# Patient Record
Sex: FEMALE | Race: WHITE | Employment: PART TIME | URBAN - METROPOLITAN AREA
[De-identification: names, ages, dates, MRNs, and addresses within clinical notes are randomized per-mention and may not be internally consistent; named-entity substitution may affect disease eponyms.]

---

## 2019-09-11 ENCOUNTER — HOSPITAL ENCOUNTER (OUTPATIENT)
Dept: WOMENS IMAGING | Age: 49
Discharge: HOME | End: 2019-09-11
Payer: COMMERCIAL

## 2019-09-11 DIAGNOSIS — Z12.31 BREAST CANCER SCREENING BY MAMMOGRAM: ICD-10-CM

## 2019-09-11 PROCEDURE — 77067 SCR MAMMO BI INCL CAD: CPT

## 2020-01-26 ENCOUNTER — APPOINTMENT (OUTPATIENT)
Dept: CT IMAGING | Age: 50
End: 2020-01-26
Payer: COMMERCIAL

## 2020-01-26 ENCOUNTER — HOSPITAL ENCOUNTER (EMERGENCY)
Age: 50
Discharge: HOME | End: 2020-01-26
Attending: EMERGENCY MEDICINE
Payer: COMMERCIAL

## 2020-01-26 VITALS
TEMPERATURE: 97.4 F | OXYGEN SATURATION: 99 % | BODY MASS INDEX: 40.85 KG/M2 | HEART RATE: 68 BPM | WEIGHT: 222 LBS | HEIGHT: 62 IN | DIASTOLIC BLOOD PRESSURE: 84 MMHG | SYSTOLIC BLOOD PRESSURE: 161 MMHG | RESPIRATION RATE: 18 BRPM

## 2020-01-26 LAB
A/G RATIO: 1.1 (ref 1.1–2.2)
ABO/RH: NORMAL
ALBUMIN SERPL-MCNC: 4.3 G/DL (ref 3.4–5)
ALP BLD-CCNC: 72 U/L (ref 40–129)
ALT SERPL-CCNC: 20 U/L (ref 10–40)
ANION GAP SERPL CALCULATED.3IONS-SCNC: 14 MMOL/L (ref 3–16)
ANTIBODY SCREEN: NORMAL
ANTIBODY SCREEN: NORMAL
AST SERPL-CCNC: 20 U/L (ref 15–37)
BACTERIA: ABNORMAL /HPF
BASOPHILS ABSOLUTE: 0.1 K/UL (ref 0–0.2)
BASOPHILS RELATIVE PERCENT: 0.6 %
BILIRUB SERPL-MCNC: 0.4 MG/DL (ref 0–1)
BILIRUBIN URINE: NEGATIVE
BLOOD, URINE: ABNORMAL
BUN BLDV-MCNC: 10 MG/DL (ref 7–20)
CALCIUM SERPL-MCNC: 9.5 MG/DL (ref 8.3–10.6)
CHLORIDE BLD-SCNC: 105 MMOL/L (ref 99–110)
CLARITY: ABNORMAL
CO2: 20 MMOL/L (ref 21–32)
COLOR: YELLOW
CREAT SERPL-MCNC: 0.6 MG/DL (ref 0.6–1.1)
EOSINOPHILS ABSOLUTE: 0 K/UL (ref 0–0.6)
EOSINOPHILS RELATIVE PERCENT: 0.3 %
EPITHELIAL CELLS, UA: 6 /HPF (ref 0–5)
GFR AFRICAN AMERICAN: >60
GFR NON-AFRICAN AMERICAN: >60
GLOBULIN: 4 G/DL
GLUCOSE BLD-MCNC: 165 MG/DL (ref 70–99)
GLUCOSE URINE: NEGATIVE MG/DL
HCG QUALITATIVE: NEGATIVE
HCT VFR BLD CALC: 41.1 % (ref 36–48)
HEMOGLOBIN: 13.2 G/DL (ref 12–16)
HYALINE CASTS: 4 /LPF (ref 0–8)
KETONES, URINE: 40 MG/DL
LEUKOCYTE ESTERASE, URINE: ABNORMAL
LIPASE: 31 U/L (ref 13–60)
LYMPHOCYTES ABSOLUTE: 3.3 K/UL (ref 1–5.1)
LYMPHOCYTES RELATIVE PERCENT: 21.9 %
MCH RBC QN AUTO: 25.7 PG (ref 26–34)
MCHC RBC AUTO-ENTMCNC: 32.1 G/DL (ref 31–36)
MCV RBC AUTO: 79.9 FL (ref 80–100)
MICROSCOPIC EXAMINATION: YES
MONOCYTES ABSOLUTE: 0.7 K/UL (ref 0–1.3)
MONOCYTES RELATIVE PERCENT: 4.6 %
NEUTROPHILS ABSOLUTE: 10.8 K/UL (ref 1.7–7.7)
NEUTROPHILS RELATIVE PERCENT: 72.6 %
NITRITE, URINE: NEGATIVE
PDW BLD-RTO: 14.6 % (ref 12.4–15.4)
PH UA: 7 (ref 5–8)
PLATELET # BLD: 271 K/UL (ref 135–450)
PMV BLD AUTO: 8.2 FL (ref 5–10.5)
POTASSIUM REFLEX MAGNESIUM: 4.7 MMOL/L (ref 3.5–5.1)
PROTEIN UA: ABNORMAL MG/DL
RBC # BLD: 5.14 M/UL (ref 4–5.2)
RBC UA: 2 /HPF (ref 0–4)
SODIUM BLD-SCNC: 139 MMOL/L (ref 136–145)
SPECIFIC GRAVITY UA: 1.02 (ref 1–1.03)
TOTAL PROTEIN: 8.3 G/DL (ref 6.4–8.2)
URINE REFLEX TO CULTURE: YES
URINE TYPE: ABNORMAL
UROBILINOGEN, URINE: 0.2 E.U./DL
WBC # BLD: 14.9 K/UL (ref 4–11)
WBC UA: 8 /HPF (ref 0–5)

## 2020-01-26 PROCEDURE — 83690 ASSAY OF LIPASE: CPT

## 2020-01-26 PROCEDURE — 36415 COLL VENOUS BLD VENIPUNCTURE: CPT

## 2020-01-26 PROCEDURE — 86900 BLOOD TYPING SEROLOGIC ABO: CPT

## 2020-01-26 PROCEDURE — 2580000003 HC RX 258: Performed by: PHYSICIAN ASSISTANT

## 2020-01-26 PROCEDURE — 96374 THER/PROPH/DIAG INJ IV PUSH: CPT

## 2020-01-26 PROCEDURE — 6360000002 HC RX W HCPCS: Performed by: PHYSICIAN ASSISTANT

## 2020-01-26 PROCEDURE — 84703 CHORIONIC GONADOTROPIN ASSAY: CPT

## 2020-01-26 PROCEDURE — 86850 RBC ANTIBODY SCREEN: CPT

## 2020-01-26 PROCEDURE — 99284 EMERGENCY DEPT VISIT MOD MDM: CPT

## 2020-01-26 PROCEDURE — 96375 TX/PRO/DX INJ NEW DRUG ADDON: CPT

## 2020-01-26 PROCEDURE — 6360000004 HC RX CONTRAST MEDICATION: Performed by: PHYSICIAN ASSISTANT

## 2020-01-26 PROCEDURE — 86901 BLOOD TYPING SEROLOGIC RH(D): CPT

## 2020-01-26 PROCEDURE — 81001 URINALYSIS AUTO W/SCOPE: CPT

## 2020-01-26 PROCEDURE — 80053 COMPREHEN METABOLIC PANEL: CPT

## 2020-01-26 PROCEDURE — 74177 CT ABD & PELVIS W/CONTRAST: CPT

## 2020-01-26 PROCEDURE — 85025 COMPLETE CBC W/AUTO DIFF WBC: CPT

## 2020-01-26 PROCEDURE — 87086 URINE CULTURE/COLONY COUNT: CPT

## 2020-01-26 RX ORDER — 0.9 % SODIUM CHLORIDE 0.9 %
1000 INTRAVENOUS SOLUTION INTRAVENOUS ONCE
Status: COMPLETED | OUTPATIENT
Start: 2020-01-26 | End: 2020-01-26

## 2020-01-26 RX ORDER — KETOROLAC TROMETHAMINE 30 MG/ML
30 INJECTION, SOLUTION INTRAMUSCULAR; INTRAVENOUS ONCE
Status: COMPLETED | OUTPATIENT
Start: 2020-01-26 | End: 2020-01-26

## 2020-01-26 RX ORDER — HYDROCODONE BITARTRATE AND ACETAMINOPHEN 5; 325 MG/1; MG/1
1 TABLET ORAL EVERY 6 HOURS PRN
Qty: 10 TABLET | Refills: 0 | Status: SHIPPED | OUTPATIENT
Start: 2020-01-26 | End: 2020-01-29

## 2020-01-26 RX ORDER — ONDANSETRON 2 MG/ML
4 INJECTION INTRAMUSCULAR; INTRAVENOUS ONCE
Status: COMPLETED | OUTPATIENT
Start: 2020-01-26 | End: 2020-01-26

## 2020-01-26 RX ADMIN — SODIUM CHLORIDE 1000 ML: 9 INJECTION, SOLUTION INTRAVENOUS at 12:06

## 2020-01-26 RX ADMIN — KETOROLAC TROMETHAMINE 30 MG: 30 INJECTION, SOLUTION INTRAMUSCULAR at 12:01

## 2020-01-26 RX ADMIN — IOPAMIDOL 75 ML: 755 INJECTION, SOLUTION INTRAVENOUS at 12:47

## 2020-01-26 RX ADMIN — ONDANSETRON 4 MG: 2 INJECTION INTRAMUSCULAR; INTRAVENOUS at 11:34

## 2020-01-26 SDOH — HEALTH STABILITY: MENTAL HEALTH: HOW OFTEN DO YOU HAVE A DRINK CONTAINING ALCOHOL?: NEVER

## 2020-01-26 ASSESSMENT — PAIN SCALES - GENERAL
PAINLEVEL_OUTOF10: 7
PAINLEVEL_OUTOF10: 10

## 2020-01-26 ASSESSMENT — ENCOUNTER SYMPTOMS
ABDOMINAL PAIN: 1
DIARRHEA: 0
COLOR CHANGE: 0
RESPIRATORY NEGATIVE: 1
VOMITING: 0
COUGH: 0
SHORTNESS OF BREATH: 0
CONSTIPATION: 0
BACK PAIN: 0
NAUSEA: 0

## 2020-01-26 ASSESSMENT — PAIN DESCRIPTION - PAIN TYPE: TYPE: ACUTE PAIN

## 2020-01-26 ASSESSMENT — PAIN DESCRIPTION - LOCATION: LOCATION: PELVIS

## 2020-01-26 NOTE — ED PROVIDER NOTES
I independently evaluated and obtained a history and physical on [address] [place] [address]. All diagnostic, treatment, and disposition assistants were made to myself in conjunction the advanced practice provider. For further details of this patient's emergency department encounter, please see the advanced practice provider's documentation. History: Patient presents to the emergency department for evaluation of lower abdominal discomfort as well as vaginal bleeding. Patient reports that she has a history of heavy menses. Her menses were heavier than usual last month. She saw her gynecologist, who prescribed her contraceptive medication that she has been taking twice daily. Her bleeding did subside initially, but on Friday she started having vaginal bleeding again as well as some pain and passage of several large clots. Rated the pain at 7 out of 10. No vaginal discharge. No fevers, chills, nausea, or vomiting. No lightheadedness or dizziness. Reports feeling better at time of my reassessment. Physician Exam: Physical Exam  Vitals signs and nursing note reviewed. Constitutional:       Appearance: She is well-developed. HENT:      Head: Normocephalic and atraumatic. Eyes:      Conjunctiva/sclera: Conjunctivae normal.      Pupils: Pupils are equal, round, and reactive to light. Neck:      Musculoskeletal: Normal range of motion and neck supple. Cardiovascular:      Rate and Rhythm: Normal rate. Pulmonary:      Effort: Pulmonary effort is normal. No respiratory distress. Abdominal:      General: There is no distension. Palpations: Abdomen is soft. Musculoskeletal: Normal range of motion. General: No deformity. Skin:     General: Skin is warm and dry. Neurological:      Mental Status: She is alert and oriented to person, place, and time. Cranial Nerves: No cranial nerve deficit. Motor: No abnormal muscle tone.    Psychiatric:         Behavior: Behavior normal.

## 2020-01-26 NOTE — ED PROVIDER NOTES
which have NOT CHANGED    Details   norethindrone (AYGESTIN) 5 MG tablet TAKE 2 BY ORAL ROUTE ONCE DAILYHistorical Med               ALLERGIES     Patient has no known allergies. FAMILYHISTORY     History reviewed. No pertinent family history. SOCIAL HISTORY       Social History     Tobacco Use    Smoking status: Never Smoker    Smokeless tobacco: Never Used   Substance Use Topics    Alcohol use: Never     Frequency: Never    Drug use: Never       SCREENINGS             PHYSICAL EXAM    (up to 7 for level 4, 8 or more for level 5)     ED Triage Vitals [01/26/20 1035]   BP Temp Temp Source Pulse Resp SpO2 Height Weight   (!) 172/99 97.4 °F (36.3 °C) Oral 65 18 100 % 5' 2\" (1.575 m) 222 lb (100.7 kg)       Physical Exam  Constitutional:       General: She is not in acute distress. Appearance: Normal appearance. She is well-developed. She is not ill-appearing, toxic-appearing or diaphoretic. HENT:      Head: Normocephalic and atraumatic. Right Ear: External ear normal.      Left Ear: External ear normal.   Eyes:      General:         Right eye: No discharge. Left eye: No discharge. Neck:      Musculoskeletal: Normal range of motion and neck supple. Cardiovascular:      Rate and Rhythm: Normal rate and regular rhythm. Pulses: Normal pulses. Heart sounds: Normal heart sounds. No murmur. No friction rub. No gallop. Pulmonary:      Effort: Pulmonary effort is normal. No respiratory distress. Breath sounds: Normal breath sounds. No stridor. No wheezing, rhonchi or rales. Chest:      Chest wall: No tenderness. Abdominal:      General: Abdomen is flat. Bowel sounds are normal. There is no distension. Palpations: Abdomen is soft. There is no mass. Tenderness: There is abdominal tenderness in the right lower quadrant, suprapubic area and left lower quadrant. There is no right CVA tenderness, left CVA tenderness, guarding or rebound.  Negative signs include ondansetron (ZOFRAN) injection 4 mg (4 mg Intravenous Given 1/26/20 1134)   ketorolac (TORADOL) injection 30 mg (30 mg Intravenous Given 1/26/20 1201)   0.9 % sodium chloride bolus (0 mLs Intravenous Stopped 1/26/20 1348)   iopamidol (ISOVUE-370) 76 % injection 75 mL (75 mLs Intravenous Given 1/26/20 1247)       Patient is a 48-year-old female who presents to the ED with complaint of abnormal uterine bleeding. Patient states she has had heavy and abnormal bleeding for the past month. Patient states he was seen by her OB/GYN and given aygestin did help her symptoms. Patient states she has been taking as prescribed. States she occasionally has missed a couple dosages but states has otherwise been taking. Patient states she started having bleeding again on Friday that was heavy. States not as heavy as when it first started but states is passing clots. Patient states she has had increasing lower abdominal/pelvic pain. States much worse in the first time. Came to the ED for further evaluation and treatment. IV established patient given Zofran, Toradol and fluids here in the ED for symptom control. Abdomen showed generalized tenderness palpation mostly over the lower abdomen. No rebound or guarding. CBC showed white count of 14.9 with normal hemoglobin and platelets. Lipase normal.  Pregnancy negative. Blood type O- urinalysis relatively unremarkable. Given history and physical examination patient complaining of lower abdominal pain with white count of 14.9 at this time. CT of the abdomen pelvis ordered at this time given patient's complaints. CT showed abnormal appearance of the lower uterine segment and cervix concerning for malignancy. There does appear to be multiple liver lesions concerning for hepatic metastatic disease with retroperitoneal and left pelvic sidewall adenopathy at this time.   Did discuss with patient abnormal CT findings at this time and given patient's abnormal uterine bleeding

## 2020-01-27 LAB — URINE CULTURE, ROUTINE: NORMAL

## 2021-02-02 ENCOUNTER — HOSPITAL ENCOUNTER (OUTPATIENT)
Dept: MRI IMAGING | Age: 51
Discharge: HOME | End: 2021-02-02
Payer: COMMERCIAL

## 2021-02-02 DIAGNOSIS — N93.9 VAGINAL BLEEDING: ICD-10-CM

## 2021-02-02 DIAGNOSIS — K76.9 LIVER DISEASE: ICD-10-CM

## 2021-02-02 PROCEDURE — 6360000004 HC RX CONTRAST MEDICATION: Performed by: INTERNAL MEDICINE

## 2021-02-02 PROCEDURE — 74183 MRI ABD W/O CNTR FLWD CNTR: CPT

## 2021-02-02 PROCEDURE — A9577 INJ MULTIHANCE: HCPCS | Performed by: INTERNAL MEDICINE

## 2021-02-02 PROCEDURE — 72197 MRI PELVIS W/O & W/DYE: CPT

## 2021-02-02 PROCEDURE — 2580000003 HC RX 258: Performed by: INTERNAL MEDICINE

## 2021-02-02 RX ORDER — SODIUM CHLORIDE 0.9 % (FLUSH) 0.9 %
10 SYRINGE (ML) INJECTION ONCE
Status: COMPLETED | OUTPATIENT
Start: 2021-02-02 | End: 2021-02-02

## 2021-02-02 RX ADMIN — GADOBENATE DIMEGLUMINE 19 ML: 529 INJECTION, SOLUTION INTRAVENOUS at 09:49

## 2021-02-02 RX ADMIN — Medication 10 ML: at 09:50

## 2021-02-17 ENCOUNTER — HOSPITAL ENCOUNTER (OUTPATIENT)
Dept: CT IMAGING | Age: 51
Discharge: HOME | End: 2021-02-17
Payer: COMMERCIAL

## 2021-02-17 PROCEDURE — 6360000004 HC RX CONTRAST MEDICATION: Performed by: INTERNAL MEDICINE

## 2021-02-17 PROCEDURE — 74170 CT ABD WO CNTRST FLWD CNTRST: CPT

## 2021-02-17 RX ADMIN — IOPAMIDOL 75 ML: 755 INJECTION, SOLUTION INTRAVENOUS at 13:31

## 2021-02-23 ENCOUNTER — HOSPITAL ENCOUNTER (OUTPATIENT)
Age: 51
Discharge: HOME | End: 2021-02-23
Payer: COMMERCIAL

## 2021-02-23 LAB
A/G RATIO: 1.4 (ref 1.1–2.2)
ALBUMIN SERPL-MCNC: 4.6 G/DL (ref 3.4–5)
ALP BLD-CCNC: 93 U/L (ref 40–129)
ALT SERPL-CCNC: 22 U/L (ref 10–40)
ANION GAP SERPL CALCULATED.3IONS-SCNC: 14 MMOL/L (ref 3–16)
AST SERPL-CCNC: 23 U/L (ref 15–37)
BASOPHILS ABSOLUTE: 0 K/UL (ref 0–0.2)
BASOPHILS RELATIVE PERCENT: 0.3 %
BILIRUB SERPL-MCNC: 0.5 MG/DL (ref 0–1)
BUN BLDV-MCNC: 17 MG/DL (ref 7–20)
CALCIUM SERPL-MCNC: 10.2 MG/DL (ref 8.3–10.6)
CHLORIDE BLD-SCNC: 102 MMOL/L (ref 99–110)
CHOLESTEROL, TOTAL: 178 MG/DL (ref 0–199)
CO2: 26 MMOL/L (ref 21–32)
CREAT SERPL-MCNC: 0.6 MG/DL (ref 0.6–1.1)
EOSINOPHILS ABSOLUTE: 0.1 K/UL (ref 0–0.6)
EOSINOPHILS RELATIVE PERCENT: 1 %
GFR AFRICAN AMERICAN: >60
GFR NON-AFRICAN AMERICAN: >60
GLOBULIN: 3.4 G/DL
GLUCOSE BLD-MCNC: 102 MG/DL (ref 70–99)
HCT VFR BLD CALC: 40.3 % (ref 36–48)
HDLC SERPL-MCNC: 76 MG/DL (ref 40–60)
HEMOGLOBIN: 13.5 G/DL (ref 12–16)
LDL CHOLESTEROL CALCULATED: 84 MG/DL
LYMPHOCYTES ABSOLUTE: 4.9 K/UL (ref 1–5.1)
LYMPHOCYTES RELATIVE PERCENT: 41.7 %
MCH RBC QN AUTO: 27.7 PG (ref 26–34)
MCHC RBC AUTO-ENTMCNC: 33.5 G/DL (ref 31–36)
MCV RBC AUTO: 82.5 FL (ref 80–100)
MONOCYTES ABSOLUTE: 0.9 K/UL (ref 0–1.3)
MONOCYTES RELATIVE PERCENT: 7.6 %
NEUTROPHILS ABSOLUTE: 5.8 K/UL (ref 1.7–7.7)
NEUTROPHILS RELATIVE PERCENT: 49.4 %
PDW BLD-RTO: 14.3 % (ref 12.4–15.4)
PLATELET # BLD: 238 K/UL (ref 135–450)
PMV BLD AUTO: 8.5 FL (ref 5–10.5)
POTASSIUM SERPL-SCNC: 4.9 MMOL/L (ref 3.5–5.1)
RBC # BLD: 4.88 M/UL (ref 4–5.2)
SODIUM BLD-SCNC: 142 MMOL/L (ref 136–145)
TOTAL PROTEIN: 8 G/DL (ref 6.4–8.2)
TRIGL SERPL-MCNC: 91 MG/DL (ref 0–150)
VITAMIN D 25-HYDROXY: 24.5 NG/ML
VLDLC SERPL CALC-MCNC: 18 MG/DL
WBC # BLD: 11.8 K/UL (ref 4–11)

## 2021-02-23 PROCEDURE — 80061 LIPID PANEL: CPT

## 2021-02-23 PROCEDURE — 36415 COLL VENOUS BLD VENIPUNCTURE: CPT

## 2021-02-23 PROCEDURE — 85025 COMPLETE CBC W/AUTO DIFF WBC: CPT

## 2021-02-23 PROCEDURE — 83036 HEMOGLOBIN GLYCOSYLATED A1C: CPT

## 2021-02-23 PROCEDURE — 80053 COMPREHEN METABOLIC PANEL: CPT

## 2021-02-23 PROCEDURE — 82306 VITAMIN D 25 HYDROXY: CPT

## 2021-02-24 LAB
ESTIMATED AVERAGE GLUCOSE: 116.9 MG/DL
HBA1C MFR BLD: 5.7 %

## 2021-10-04 ENCOUNTER — HOSPITAL ENCOUNTER (OUTPATIENT)
Dept: WOMENS IMAGING | Age: 51
Discharge: HOME | End: 2021-10-04
Payer: COMMERCIAL

## 2021-10-04 VITALS — WEIGHT: 200 LBS | HEIGHT: 62 IN | BODY MASS INDEX: 36.8 KG/M2

## 2021-10-04 DIAGNOSIS — Z12.31 ENCOUNTER FOR SCREENING MAMMOGRAM FOR MALIGNANT NEOPLASM OF BREAST: ICD-10-CM

## 2021-10-04 PROCEDURE — 77063 BREAST TOMOSYNTHESIS BI: CPT

## 2021-10-14 ENCOUNTER — OFFICE VISIT (OUTPATIENT)
Dept: VASCULAR SURGERY | Age: 51
End: 2021-10-14
Payer: COMMERCIAL

## 2021-10-14 VITALS — HEIGHT: 62 IN | BODY MASS INDEX: 36.44 KG/M2 | WEIGHT: 198 LBS

## 2021-10-14 DIAGNOSIS — I83.893 SYMPTOMATIC VARICOSE VEINS OF BOTH LOWER EXTREMITIES: Primary | ICD-10-CM

## 2021-10-14 PROCEDURE — 1036F TOBACCO NON-USER: CPT | Performed by: SURGERY

## 2021-10-14 PROCEDURE — 3017F COLORECTAL CA SCREEN DOC REV: CPT | Performed by: SURGERY

## 2021-10-14 PROCEDURE — G8417 CALC BMI ABV UP PARAM F/U: HCPCS | Performed by: SURGERY

## 2021-10-14 PROCEDURE — G8484 FLU IMMUNIZE NO ADMIN: HCPCS | Performed by: SURGERY

## 2021-10-14 PROCEDURE — 99204 OFFICE O/P NEW MOD 45 MIN: CPT | Performed by: SURGERY

## 2021-10-14 PROCEDURE — G8428 CUR MEDS NOT DOCUMENT: HCPCS | Performed by: SURGERY

## 2021-10-14 RX ORDER — POTASSIUM CHLORIDE 750 MG/1
CAPSULE, EXTENDED RELEASE ORAL
COMMUNITY
Start: 2021-08-11

## 2021-10-14 RX ORDER — LOSARTAN POTASSIUM 50 MG/1
TABLET ORAL
COMMUNITY
Start: 2021-09-07

## 2021-10-14 RX ORDER — HYDROCHLOROTHIAZIDE 25 MG/1
TABLET ORAL
COMMUNITY
Start: 2021-10-08

## 2021-10-14 RX ORDER — MULTIVIT-MIN/IRON/FOLIC ACID/K 18-600-40
CAPSULE ORAL
COMMUNITY
End: 2021-11-18 | Stop reason: ALTCHOICE

## 2021-10-14 ASSESSMENT — ENCOUNTER SYMPTOMS
EYES NEGATIVE: 1
ALLERGIC/IMMUNOLOGIC NEGATIVE: 1
BACK PAIN: 1
RESPIRATORY NEGATIVE: 1
GASTROINTESTINAL NEGATIVE: 1

## 2021-10-14 NOTE — PROGRESS NOTES
Subjective:      Patient ID: Radha Venegas is a 45 y.o. female. HPI Referred for evaluation of bilateral varicose veins and associated throbbing achy pain worse with standing kneeling and squatting as well as menses. Improved with elevation. Patient has not worn compression stockings. No previous venous interventions. No h/o SVT, bleeding, ulceration, dermatitis or swelling. + FH    Past Medical History:   Diagnosis Date    History of dilatation and curettage 02/17/2020     Past Surgical History:   Procedure Laterality Date    MOUTH SURGERY       No Known Allergies  Current Outpatient Medications   Medication Sig Dispense Refill    Cholecalciferol (VITAMIN D) 50 MCG (2000 UT) CAPS capsule Take by mouth      potassium chloride (MICRO-K) 10 MEQ extended release capsule       losartan (COZAAR) 50 MG tablet TAKE 1 TABLET BY MOUTH EVERY DAY      hydroCHLOROthiazide (HYDRODIURIL) 25 MG tablet       norethindrone (AYGESTIN) 5 MG tablet TAKE 2 BY ORAL ROUTE ONCE DAILY       No current facility-administered medications for this visit. Social History     Socioeconomic History    Marital status:      Spouse name: Not on file    Number of children: Not on file    Years of education: Not on file    Highest education level: Not on file   Occupational History    Not on file   Tobacco Use    Smoking status: Never Smoker    Smokeless tobacco: Never Used   Substance and Sexual Activity    Alcohol use: Never    Drug use: Never    Sexual activity: Not on file   Other Topics Concern    Not on file   Social History Narrative    Not on file     Social Determinants of Health     Financial Resource Strain:     Difficulty of Paying Living Expenses:    Food Insecurity:     Worried About Running Out of Food in the Last Year:     [address] in the Last Year:    Transportation Needs:     Lack of Transportation (Medical):      Lack of Transportation (Non-Medical):    Physical Activity:     Days of Exercise per Week:     Minutes of Exercise per Session:    Stress:     Feeling of Stress :    Social Connections:     Frequency of Communication with Friends and Family:     Frequency of Social Gatherings with Friends and Family:     Attends Hinduism Services:     Active Member of Clubs or Organizations:     Attends Club or Organization Meetings:     Marital Status:    Intimate Partner Violence:     Fear of Current or Ex-Partner:     Emotionally Abused:     Physically Abused:     Sexually Abused:      No family history on file. Review of Systems   Constitutional: Positive for activity change (increase) and appetite change (decrease). HENT: Negative. Eyes: Negative. Respiratory: Negative. Cardiovascular: Positive for leg swelling. Gastrointestinal: Negative. Endocrine: Negative. Genitourinary: Negative. Musculoskeletal: Positive for back pain. Skin: Negative. Allergic/Immunologic: Negative. Neurological: Negative. Hematological: Bruises/bleeds easily. Psychiatric/Behavioral: Negative. Objective:   Physical Exam  Vitals and nursing note reviewed. Constitutional:       Appearance: She is obese. HENT:      Head: Normocephalic and atraumatic. Right Ear: External ear normal.      Left Ear: External ear normal.      Nose: Nose normal.      Mouth/Throat:      Mouth: Mucous membranes are moist.      Pharynx: Oropharynx is clear. Eyes:      Extraocular Movements: Extraocular movements intact. Conjunctiva/sclera: Conjunctivae normal.   Cardiovascular:      Rate and Rhythm: Normal rate and regular rhythm. Pulses: Normal pulses. Heart sounds: Normal heart sounds. Pulmonary:      Effort: Pulmonary effort is normal.      Breath sounds: Normal breath sounds. Abdominal:      General: Abdomen is flat. Palpations: Abdomen is soft. Musculoskeletal:      Cervical back: Normal range of motion and neck supple.       Right lower leg: No

## 2021-10-14 NOTE — LETTER
[facility]  [facility]  Phone: [phone]  Fax: [phone]    Shahab Sotelo MD    October 14, 2021     [id number] [facility], [address] [place] [address] [address]    Patient: Elkin Nagel   MR Number: [id number]   YOB: 1970   Date of Visit: 10/14/2021       Dear [id number] [facility]: Thank you for referring Meseret Gurrola to me for evaluation/treatment. Below are the relevant portions of my assessment and plan of care. Symptomatic varicose veins B legs  Spider telangiectases B legs    Begin TH 20/30 mmHg compression stockings as tolerated. F/U after venous duplex scan to discuss test results and treatment options. Pt understands and agrees to this approach. If you have questions, please do not hesitate to call me. I look forward to following Dorie Galicia along with you.     Sincerely,      Shahab Sotelo MD

## 2021-11-02 ENCOUNTER — HOSPITAL ENCOUNTER (OUTPATIENT)
Age: 51
Discharge: HOME | End: 2021-11-02
Payer: COMMERCIAL

## 2021-11-02 PROCEDURE — 93005 ELECTROCARDIOGRAM TRACING: CPT | Performed by: FAMILY MEDICINE

## 2021-11-03 LAB
EKG ATRIAL RATE: 63 BPM
EKG DIAGNOSIS: NORMAL
EKG P AXIS: 59 DEGREES
EKG P-R INTERVAL: 142 MS
EKG Q-T INTERVAL: 412 MS
EKG QRS DURATION: 86 MS
EKG QTC CALCULATION (BAZETT): 421 MS
EKG R AXIS: -11 DEGREES
EKG T AXIS: 12 DEGREES
EKG VENTRICULAR RATE: 63 BPM

## 2021-11-03 PROCEDURE — 93010 ELECTROCARDIOGRAM REPORT: CPT | Performed by: INTERNAL MEDICINE

## 2021-11-18 ENCOUNTER — HOSPITAL ENCOUNTER (INPATIENT)
Age: 51
LOS: 5 days | Discharge: HOME | DRG: 197 | End: 2021-11-23
Attending: INTERNAL MEDICINE | Admitting: INTERNAL MEDICINE
Payer: COMMERCIAL

## 2021-11-18 ENCOUNTER — APPOINTMENT (OUTPATIENT)
Dept: CT IMAGING | Age: 51
DRG: 197 | End: 2021-11-18
Payer: COMMERCIAL

## 2021-11-18 DIAGNOSIS — I82.4Y1 ACUTE DEEP VEIN THROMBOSIS (DVT) OF PROXIMAL VEIN OF RIGHT LOWER EXTREMITY (HCC): ICD-10-CM

## 2021-11-18 DIAGNOSIS — I26.99 OTHER ACUTE PULMONARY EMBOLISM WITHOUT ACUTE COR PULMONALE (HCC): Primary | ICD-10-CM

## 2021-11-18 LAB
A/G RATIO: 1.2 (ref 1.1–2.2)
ALBUMIN SERPL-MCNC: 4.2 G/DL (ref 3.4–5)
ALP BLD-CCNC: 157 U/L (ref 40–129)
ALT SERPL-CCNC: 26 U/L (ref 10–40)
ANION GAP SERPL CALCULATED.3IONS-SCNC: 13 MMOL/L (ref 3–16)
APTT: 29.4 SEC (ref 26.2–38.6)
APTT: 30.7 SEC (ref 26.2–38.6)
APTT: 88.7 SEC (ref 26.2–38.6)
AST SERPL-CCNC: 24 U/L (ref 15–37)
ATYPICAL LYMPHOCYTE RELATIVE PERCENT: 10 % (ref 0–6)
BANDED NEUTROPHILS RELATIVE PERCENT: 3 % (ref 0–7)
BASOPHILS ABSOLUTE: 0 K/UL (ref 0–0.2)
BASOPHILS RELATIVE PERCENT: 0 %
BILIRUB SERPL-MCNC: 0.9 MG/DL (ref 0–1)
BUN BLDV-MCNC: 15 MG/DL (ref 7–20)
CALCIUM SERPL-MCNC: 9.6 MG/DL (ref 8.3–10.6)
CHLORIDE BLD-SCNC: 99 MMOL/L (ref 99–110)
CO2: 25 MMOL/L (ref 21–32)
CREAT SERPL-MCNC: 0.6 MG/DL (ref 0.6–1.1)
EOSINOPHILS ABSOLUTE: 0 K/UL (ref 0–0.6)
EOSINOPHILS RELATIVE PERCENT: 0 %
GFR AFRICAN AMERICAN: >60
GFR NON-AFRICAN AMERICAN: >60
GLUCOSE BLD-MCNC: 108 MG/DL (ref 70–99)
HCG QUALITATIVE: NEGATIVE
HCT VFR BLD CALC: 42.1 % (ref 36–48)
HCT VFR BLD CALC: 43.4 % (ref 36–48)
HEMOGLOBIN: 13.8 G/DL (ref 12–16)
HEMOGLOBIN: 14.5 G/DL (ref 12–16)
INR BLD: 0.95 (ref 0.88–1.12)
LYMPHOCYTES ABSOLUTE: 6.5 K/UL (ref 1–5.1)
LYMPHOCYTES RELATIVE PERCENT: 34 %
MCH RBC QN AUTO: 26.9 PG (ref 26–34)
MCH RBC QN AUTO: 27.1 PG (ref 26–34)
MCHC RBC AUTO-ENTMCNC: 32.9 G/DL (ref 31–36)
MCHC RBC AUTO-ENTMCNC: 33.4 G/DL (ref 31–36)
MCV RBC AUTO: 81.1 FL (ref 80–100)
MCV RBC AUTO: 81.9 FL (ref 80–100)
MICROCYTES: ABNORMAL
MONOCYTES ABSOLUTE: 1.2 K/UL (ref 0–1.3)
MONOCYTES RELATIVE PERCENT: 8 %
NEUTROPHILS ABSOLUTE: 7.1 K/UL (ref 1.7–7.7)
NEUTROPHILS RELATIVE PERCENT: 45 %
PDW BLD-RTO: 15 % (ref 12.4–15.4)
PDW BLD-RTO: 15 % (ref 12.4–15.4)
PLATELET # BLD: 166 K/UL (ref 135–450)
PLATELET # BLD: 172 K/UL (ref 135–450)
PLATELET SLIDE REVIEW: ADEQUATE
PMV BLD AUTO: 8.1 FL (ref 5–10.5)
PMV BLD AUTO: 8.2 FL (ref 5–10.5)
POTASSIUM SERPL-SCNC: 4 MMOL/L (ref 3.5–5.1)
PROTHROMBIN TIME: 10.7 SEC (ref 9.9–12.7)
RBC # BLD: 5.14 M/UL (ref 4–5.2)
RBC # BLD: 5.35 M/UL (ref 4–5.2)
SLIDE REVIEW: ABNORMAL
SODIUM BLD-SCNC: 137 MMOL/L (ref 136–145)
TOTAL PROTEIN: 7.8 G/DL (ref 6.4–8.2)
TROPONIN: <0.01 NG/ML
WBC # BLD: 14 K/UL (ref 4–11)
WBC # BLD: 14.7 K/UL (ref 4–11)

## 2021-11-18 PROCEDURE — 71260 CT THORAX DX C+: CPT

## 2021-11-18 PROCEDURE — 6360000004 HC RX CONTRAST MEDICATION: Performed by: PHYSICIAN ASSISTANT

## 2021-11-18 PROCEDURE — 36415 COLL VENOUS BLD VENIPUNCTURE: CPT

## 2021-11-18 PROCEDURE — 80053 COMPREHEN METABOLIC PANEL: CPT

## 2021-11-18 PROCEDURE — 6360000002 HC RX W HCPCS: Performed by: PHYSICIAN ASSISTANT

## 2021-11-18 PROCEDURE — 99284 EMERGENCY DEPT VISIT MOD MDM: CPT

## 2021-11-18 PROCEDURE — 85730 THROMBOPLASTIN TIME PARTIAL: CPT

## 2021-11-18 PROCEDURE — 6370000000 HC RX 637 (ALT 250 FOR IP): Performed by: INTERNAL MEDICINE

## 2021-11-18 PROCEDURE — 85027 COMPLETE CBC AUTOMATED: CPT

## 2021-11-18 PROCEDURE — 6370000000 HC RX 637 (ALT 250 FOR IP): Performed by: HOSPITALIST

## 2021-11-18 PROCEDURE — 6370000000 HC RX 637 (ALT 250 FOR IP): Performed by: PHYSICIAN ASSISTANT

## 2021-11-18 PROCEDURE — 85025 COMPLETE CBC W/AUTO DIFF WBC: CPT

## 2021-11-18 PROCEDURE — 2060000000 HC ICU INTERMEDIATE R&B

## 2021-11-18 PROCEDURE — 93005 ELECTROCARDIOGRAM TRACING: CPT | Performed by: PHYSICIAN ASSISTANT

## 2021-11-18 PROCEDURE — 84484 ASSAY OF TROPONIN QUANT: CPT

## 2021-11-18 PROCEDURE — 84703 CHORIONIC GONADOTROPIN ASSAY: CPT

## 2021-11-18 PROCEDURE — 93971 EXTREMITY STUDY: CPT

## 2021-11-18 PROCEDURE — 85610 PROTHROMBIN TIME: CPT

## 2021-11-18 PROCEDURE — 2580000003 HC RX 258: Performed by: INTERNAL MEDICINE

## 2021-11-18 RX ORDER — HEPARIN SODIUM 1000 [USP'U]/ML
40 INJECTION, SOLUTION INTRAVENOUS; SUBCUTANEOUS PRN
Status: DISCONTINUED | OUTPATIENT
Start: 2021-11-18 | End: 2021-11-23 | Stop reason: HOSPADM

## 2021-11-18 RX ORDER — LOSARTAN POTASSIUM 25 MG/1
50 TABLET ORAL DAILY
Status: DISCONTINUED | OUTPATIENT
Start: 2021-11-18 | End: 2021-11-23 | Stop reason: HOSPADM

## 2021-11-18 RX ORDER — ACETAMINOPHEN 650 MG/1
650 SUPPOSITORY RECTAL EVERY 6 HOURS PRN
Status: DISCONTINUED | OUTPATIENT
Start: 2021-11-18 | End: 2021-11-23 | Stop reason: HOSPADM

## 2021-11-18 RX ORDER — AMLODIPINE BESYLATE 5 MG/1
5 TABLET ORAL DAILY
COMMUNITY

## 2021-11-18 RX ORDER — SODIUM CHLORIDE 0.9 % (FLUSH) 0.9 %
5-40 SYRINGE (ML) INJECTION PRN
Status: DISCONTINUED | OUTPATIENT
Start: 2021-11-18 | End: 2021-11-23 | Stop reason: HOSPADM

## 2021-11-18 RX ORDER — HYDROCODONE BITARTRATE AND ACETAMINOPHEN 5; 325 MG/1; MG/1
2 TABLET ORAL ONCE
Status: COMPLETED | OUTPATIENT
Start: 2021-11-18 | End: 2021-11-18

## 2021-11-18 RX ORDER — ACETAMINOPHEN 325 MG/1
650 TABLET ORAL EVERY 6 HOURS PRN
Status: DISCONTINUED | OUTPATIENT
Start: 2021-11-18 | End: 2021-11-23 | Stop reason: HOSPADM

## 2021-11-18 RX ORDER — SODIUM CHLORIDE 9 MG/ML
25 INJECTION, SOLUTION INTRAVENOUS PRN
Status: DISCONTINUED | OUTPATIENT
Start: 2021-11-18 | End: 2021-11-23 | Stop reason: HOSPADM

## 2021-11-18 RX ORDER — HYDROCODONE BITARTRATE AND ACETAMINOPHEN 7.5; 325 MG/1; MG/1
1 TABLET ORAL EVERY 6 HOURS PRN
Status: DISCONTINUED | OUTPATIENT
Start: 2021-11-18 | End: 2021-11-23 | Stop reason: HOSPADM

## 2021-11-18 RX ORDER — ONDANSETRON 4 MG/1
4 TABLET, ORALLY DISINTEGRATING ORAL EVERY 8 HOURS PRN
Status: DISCONTINUED | OUTPATIENT
Start: 2021-11-18 | End: 2021-11-23 | Stop reason: HOSPADM

## 2021-11-18 RX ORDER — ONDANSETRON 2 MG/ML
4 INJECTION INTRAMUSCULAR; INTRAVENOUS EVERY 6 HOURS PRN
Status: DISCONTINUED | OUTPATIENT
Start: 2021-11-18 | End: 2021-11-23 | Stop reason: HOSPADM

## 2021-11-18 RX ORDER — HYDROCHLOROTHIAZIDE 25 MG/1
25 TABLET ORAL DAILY
Status: DISCONTINUED | OUTPATIENT
Start: 2021-11-18 | End: 2021-11-23 | Stop reason: HOSPADM

## 2021-11-18 RX ORDER — HEPARIN SODIUM 10000 [USP'U]/100ML
5-30 INJECTION, SOLUTION INTRAVENOUS CONTINUOUS
Status: DISCONTINUED | OUTPATIENT
Start: 2021-11-18 | End: 2021-11-23 | Stop reason: HOSPADM

## 2021-11-18 RX ORDER — HEPARIN SODIUM 1000 [USP'U]/ML
80 INJECTION, SOLUTION INTRAVENOUS; SUBCUTANEOUS PRN
Status: DISCONTINUED | OUTPATIENT
Start: 2021-11-18 | End: 2021-11-23 | Stop reason: HOSPADM

## 2021-11-18 RX ORDER — POLYETHYLENE GLYCOL 3350 17 G/17G
17 POWDER, FOR SOLUTION ORAL DAILY PRN
Status: DISCONTINUED | OUTPATIENT
Start: 2021-11-18 | End: 2021-11-23 | Stop reason: HOSPADM

## 2021-11-18 RX ORDER — HEPARIN SODIUM 1000 [USP'U]/ML
5000 INJECTION, SOLUTION INTRAVENOUS; SUBCUTANEOUS ONCE
Status: COMPLETED | OUTPATIENT
Start: 2021-11-18 | End: 2021-11-18

## 2021-11-18 RX ORDER — SODIUM CHLORIDE 0.9 % (FLUSH) 0.9 %
5-40 SYRINGE (ML) INJECTION EVERY 12 HOURS SCHEDULED
Status: DISCONTINUED | OUTPATIENT
Start: 2021-11-18 | End: 2021-11-23 | Stop reason: HOSPADM

## 2021-11-18 RX ORDER — ONDANSETRON 4 MG/1
4 TABLET, ORALLY DISINTEGRATING ORAL ONCE
Status: COMPLETED | OUTPATIENT
Start: 2021-11-18 | End: 2021-11-18

## 2021-11-18 RX ADMIN — ACETAMINOPHEN 650 MG: 325 TABLET ORAL at 18:40

## 2021-11-18 RX ADMIN — HEPARIN SODIUM 18 UNITS/KG/HR: 5000 INJECTION INTRAVENOUS; SUBCUTANEOUS at 16:33

## 2021-11-18 RX ADMIN — Medication 10 ML: at 20:38

## 2021-11-18 RX ADMIN — LOSARTAN POTASSIUM 50 MG: 25 TABLET, FILM COATED ORAL at 18:40

## 2021-11-18 RX ADMIN — HYDROCHLOROTHIAZIDE 25 MG: 25 TABLET ORAL at 18:41

## 2021-11-18 RX ADMIN — HYDROCODONE BITARTRATE AND ACETAMINOPHEN 2 TABLET: 5; 325 TABLET ORAL at 10:56

## 2021-11-18 RX ADMIN — HEPARIN SODIUM 5000 UNITS: 1000 INJECTION INTRAVENOUS; SUBCUTANEOUS at 16:30

## 2021-11-18 RX ADMIN — HYDROCODONE BITARTRATE AND ACETAMINOPHEN 1 TABLET: 7.5; 325 TABLET ORAL at 18:40

## 2021-11-18 RX ADMIN — ONDANSETRON 4 MG: 4 TABLET, ORALLY DISINTEGRATING ORAL at 10:57

## 2021-11-18 RX ADMIN — IOPAMIDOL 75 ML: 755 INJECTION, SOLUTION INTRAVENOUS at 14:52

## 2021-11-18 ASSESSMENT — PAIN SCALES - GENERAL
PAINLEVEL_OUTOF10: 9
PAINLEVEL_OUTOF10: 5
PAINLEVEL_OUTOF10: 8

## 2021-11-18 ASSESSMENT — ENCOUNTER SYMPTOMS
NAUSEA: 0
DIARRHEA: 0
SHORTNESS OF BREATH: 0
ABDOMINAL PAIN: 0
COUGH: 0
VOMITING: 0
RHINORRHEA: 0

## 2021-11-18 ASSESSMENT — PAIN DESCRIPTION - LOCATION: LOCATION: LEG

## 2021-11-18 ASSESSMENT — PAIN - FUNCTIONAL ASSESSMENT: PAIN_FUNCTIONAL_ASSESSMENT: PREVENTS OR INTERFERES SOME ACTIVE ACTIVITIES AND ADLS

## 2021-11-18 ASSESSMENT — PAIN DESCRIPTION - ORIENTATION: ORIENTATION: RIGHT

## 2021-11-18 ASSESSMENT — PAIN DESCRIPTION - DESCRIPTORS: DESCRIPTORS: SORE;ACHING

## 2021-11-18 NOTE — H&P
Hospital Medicine History & Physical      PCP: Luis Burks DO    Date of Admission: 11/18/2021    Date of Service: Pt seen/examined on 11/18/2021 and Admitted to Inpatient with expected LOS greater than two midnights due to medical therapy. Chief Complaint: Pulmonary embolism      History Of Present Illness:  45 y.o. female with past medical history of varicose veins, recent history of cyst removed from her right foot, hypertension, mild intermittent asthma, history of liver lesion, history of blood disorder described as elevated white blood cells without known etiology presents to the ER with several days of progressive right posterior calf pain. Patient described pain as soreness, progressing over 3 to 4 days, associated with swelling, associated with worsening of her varicose veins, progressing to the point where she could barely weight-bear on that leg. She denies any chest pain or shortness of breath. She denies any recent travel or prolonged immobilization. He does state that couple of weeks ago she started wearing compression stockings as prescribed by vascular surgeon. At the ED patient found to have DVT of the right lower extremity prompting evaluation with CT chest with PE protocol. That revealed multiple bilateral lobar pulmonary emboli. Patient started on heparin drip. Hospitalist consulted for admission. Past Medical History:          Diagnosis Date    History of dilatation and curettage 02/17/2020       Past Surgical History:          Procedure Laterality Date    MOUTH SURGERY         Medications Prior to Admission:      Prior to Admission medications    Medication Sig Start Date End Date Taking?  Authorizing Provider   Cholecalciferol (VITAMIN D3) 125 MCG (5000 UT) TABS Take by mouth   Yes Historical Provider, MD   potassium chloride (MICRO-K) 10 MEQ extended release capsule  8/11/21  Yes Historical Provider, MD   losartan (COZAAR) 50 MG tablet TAKE 1 TABLET BY MOUTH EVERY DAY 9/7/21  Yes Historical Provider, MD   hydroCHLOROthiazide (HYDRODIURIL) 25 MG tablet  10/8/21  Yes Historical Provider, MD       Allergies:  Patient has no known allergies. Social History:      The patient currently lives home    TOBACCO:   reports that she has never smoked. She has never used smokeless tobacco.  ETOH:   reports no history of alcohol use. E-Cigarettes/Vaping Use     Questions Responses    E-Cigarette/Vaping Use     Start Date     Passive Exposure     Quit Date     Counseling Given     Comments             Family History:       Reviewed in detail and negative for DM, CAD, Cancer, CVA. Positive as follows:    History reviewed. No pertinent family history. REVIEW OF SYSTEMS:   Pertinent positives as noted in the HPI. All other systems reviewed and negative. PHYSICAL EXAM PERFORMED:    BP (!) 151/95   Pulse 105   Temp 98 °F (36.7 °C) (Oral)   Resp 20   Wt 189 lb (85.7 kg)   SpO2 99%   BMI 34.57 kg/m²     General appearance:  No apparent distress, appears stated age and cooperative. HEENT:  Normal cephalic, atraumatic without obvious deformity. Pupils equal, round, and reactive to light. Extra ocular muscles intact. Conjunctivae/corneas clear. Neck: Supple, with full range of motion. No jugular venous distention. Trachea midline. Respiratory:  Normal respiratory effort. Clear to auscultation, bilaterally without Rales/Wheezes/Rhonchi. Cardiovascular:  Regular rate and rhythm with normal S1/S2 without murmurs, rubs or gallops. Abdomen: Soft, non-tender, non-distended with normal bowel sounds. Musculoskeletal: Patient has bilateral findings of varicose veins and spider angioma on bilateral lower leg. Her right leg is slightly bigger in size. Tender to palpation. No clubbing, cyanosis or edema bilaterally. Full range of motion without deformity. Skin: Skin color, texture, turgor normal.  No rashes or lesions.   Neurologic:  Neurovascularly intact without any focal sensory/motor deficits. Cranial nerves: II-XII intact, grossly non-focal.  Psychiatric:  Alert and oriented, thought content appropriate, normal insight  Capillary Refill: Brisk,< 3 seconds   Peripheral Pulses: +2 palpable, equal bilaterally       Labs:     Recent Labs     11/18/21  1333   WBC 14.7*   HGB 14.5   HCT 43.4        Recent Labs     11/18/21  1333      K 4.0   CL 99   CO2 25   BUN 15   CREATININE 0.6   CALCIUM 9.6     Recent Labs     11/18/21  1333   AST 24   ALT 26   BILITOT 0.9   ALKPHOS 157*     Recent Labs     11/18/21  1333   INR 0.95     Recent Labs     11/18/21  1333   TROPONINI <0.01       Urinalysis:      Lab Results   Component Value Date    NITRU Negative 01/26/2020    WBCUA 8 01/26/2020    BACTERIA 1+ 01/26/2020    RBCUA 2 01/26/2020    BLOODU LARGE 01/26/2020    SPECGRAV 1.019 01/26/2020    GLUCOSEU Negative 01/26/2020       Radiology:     CXR: I have reviewed the CXR with the following interpretation: Not available  EKG:  I have reviewed the EKG with the following interpretation: NSR, no acute ischemic changes    CT CHEST PULMONARY EMBOLISM W CONTRAST   Final Result   Bilateral pulmonary emboli, primarily in lobar vessels, clot burden estimated   to be mild-to-moderate. No evidence of right ventricular heart strain. Small pericardial effusion. Small right pleural effusion. 3 mm nodule in the right lower lobe. If there are no risk factors, no   follow-up may be necessary. Otherwise, 12 month follow-up is suggested. Critical results were called by Dr. Tenizn Moreno MD to [facility] on   11/18/2021 at 15:21. RECOMMENDATIONS:   Fleischner Society guidelines for follow-up and management of incidentally   detected pulmonary nodules:      Single Solid Nodule:      Nodule size less than 6 mm   In a low-risk patient, no routine follow-up.    In a high-risk patient, optional CT at 12 months.      -Low risk patients include individuals with minimal or absent history of   smoking and other known risk factors. - High risk patients include individuals with a history or smoking or known   risk factors. Radiology 2017 http://pubs. rsna.org/doi/full/10.1148/radiol. 5465962049         VL Extremity Venous Right   Final Result          ASSESSMENT:    Active Hospital Problems    Diagnosis Date Noted    Pulmonary embolism, bilateral ([address] [I26.99] 11/18/2021         PLAN:    Multiple pulmonary emboli  Bilateral  Source right lower extremity DVT  Provoked by compression stocking?   Cyst removal?  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 as per patient  Has been seen by hematology in the past  We will consult hematology for further evaluation  Continue heparin drip  Plan to transition to [address] prior to discharge    Hypertension  Controlled  Continue home medications    Mild intermittent asthma  Not in exacerbation    Obesity  Patient states that she has been on a weight loss program since April 2021  Reports 45 pound weight loss    History of liver lesion  Patient states that she has been diagnosed with small subcentimeter liver lesion  She has established follow-up with gastroenterology  She was told it is benign  No gastroenterology note available for review    3 mm nodule in the right lower lung lobe  Follow-up in 12 months depending on risk factors    DVT Prophylaxis: Heparin GGT  Diet: No diet orders on file  Code Status: No Order    Electronically signed by Sheri Rodriguez MD on 11/18/2021 at 4:08 PM

## 2021-11-18 NOTE — ED PROVIDER NOTES
EKG is reviewed by myself. Dated today at 12. Rate 84. Sinus rhythm. RSR prime in V1. Otherwise normal EKG.   No change from November 2, 2021     Marley Carolina MD  11/18/21 5290

## 2021-11-18 NOTE — ED NOTES
Spoke to charge nurse on 3T concerning inpatient nurse coming down for report.       Arturo Rivera RN  11/18/21 9235

## 2021-11-18 NOTE — ED NOTES
Pharmacy Medication History Note      List of current medications patient is taking is complete. Source of information: patient and fill hx     Changes made to medication list:  Medications flagged for removal (include reason, ex. noncompliance):  none    Medications removed (include reason, ex. therapy complete or physician discontinued):  See below - therapy completed    Medications added/doses adjusted:  Vitamin D - dose adjusted     Other notes (ex. Recent course of antibiotics, Coumadin dosing):  Denies use of other OTC or herbal medications. Last dose times updated. Eliazar Hatchet, PharmD  Pharmacy Resident   [id number]    No current facility-administered medications on file prior to encounter.      Current Outpatient Medications on File Prior to Encounter   Medication Sig Dispense Refill    Cholecalciferol (VITAMIN D3) 125 MCG (5000 UT) TABS Take by mouth      potassium chloride (MICRO-K) 10 MEQ extended release capsule       losartan (COZAAR) 50 MG tablet TAKE 1 TABLET BY MOUTH EVERY DAY      hydroCHLOROthiazide (HYDRODIURIL) 25 MG tablet       [DISCONTINUED] Cholecalciferol (VITAMIN D) 50 MCG (2000 UT) CAPS capsule Take by mouth      [DISCONTINUED] norethindrone (AYGESTIN) 5 MG tablet TAKE 2 BY ORAL ROUTE ONCE DAILY

## 2021-11-18 NOTE — ED PROVIDER NOTES
[facility]        Pt Name: Alla Hernandez  MRN: [id number]  [place] 1970  Date of evaluation: 11/18/2021  Provider: Nader Mckeon PA-C  PCP: Matt Deleon DO  Note Started: 11:23 AM EST       ERIN. I have evaluated this patient. My supervising physician was available for consultation. CHIEF COMPLAINT       Chief Complaint   Patient presents with    Back Pain     Pt to triage with c/o back pain radiating down to right leg x 3 days- states she went to urgent care yesterday and was told she had a \"strained muscle\" and was given a prescription for a \"muscle relaxer. \"  Pt states medication does not work. HISTORY OF PRESENT ILLNESS   (Location, Timing/Onset, Context/Setting, Quality, Duration, Modifying Factors, Severity, Associated Signs and Symptoms)  Note limiting factors. Chief Complaint: Back pain, calf pain    Alla Hernandez is a 45 y.o. female who presents to the emergency department today for evaluation for back pain, and calf pain on the right. The patient states that she did have a cyst removed from the top of her foot, and she states that this was performed on 11/4/2021. The patient states that she tolerated the procedure well with no abnormalities. The patient states she has been sleeping on the couch secondary to her recent surgery. The patient states that 3 days ago she got up and she thought that she felt a \"pop\" to her mid back. The patient states she did not really think anything of it. The patient states that she is having some pain to her right lower back, and she states that occasionally she will notice pain that is rating down her right leg. The patient states that since last night however she is had worsening pain to her right calf.   The patient states that she does not really report much back pain or radiculopathy at this time she is mostly here for concern of pain to her right calf only. She states that she can really walk, cannot secondary to pain. Patient is currently rating her discomfort as a 9/10, pain is worse with ambulation, touching movement. Patient has no chest pain. She has no shortness of breath. She has no fever chills. She has no nausea, vomiting or diarrhea. She denies any fall or injury. She has no bowel or bladder incontinence or retention. No saddle anesthesias. Patient otherwise has no complaints at this time    Nursing Notes were all reviewed and agreed with or any disagreements were addressed in the HPI. REVIEW OF SYSTEMS    (2-9 systems for level 4, 10 or more for level 5)     Review of Systems   Constitutional: Negative for activity change, appetite change, chills and fever. HENT: Negative for congestion and rhinorrhea. Respiratory: Negative for cough and shortness of breath. Cardiovascular: Negative for chest pain. Gastrointestinal: Negative for abdominal pain, diarrhea, nausea and vomiting. Genitourinary: Negative for difficulty urinating, dysuria and hematuria. Musculoskeletal: Positive for myalgias. Neurological: Negative for weakness and numbness. Positives and Pertinent negatives as per HPI. Except as noted above in the ROS, all other systems were reviewed and negative. PAST MEDICAL HISTORY     Past Medical History:   Diagnosis Date    History of dilatation and curettage 02/17/2020         SURGICAL HISTORY     Past Surgical History:   Procedure Laterality Date    MOUTH SURGERY           CURRENTMEDICATIONS       Previous Medications    CHOLECALCIFEROL (VITAMIN D3) 125 MCG (5000 UT) TABS    Take by mouth    HYDROCHLOROTHIAZIDE (HYDRODIURIL) 25 MG TABLET        LOSARTAN (COZAAR) 50 MG TABLET    TAKE 1 TABLET BY MOUTH EVERY DAY    POTASSIUM CHLORIDE (MICRO-K) 10 MEQ EXTENDED RELEASE CAPSULE             ALLERGIES     Patient has no known allergies. FAMILYHISTORY     History reviewed. No pertinent family history. SOCIAL HISTORY       Social History     Tobacco Use    Smoking status: Never Smoker    Smokeless tobacco: Never Used   Substance Use Topics    Alcohol use: Never    Drug use: Never       SCREENINGS             PHYSICAL EXAM    (up to 7 for level 4, 8 or more for level 5)     ED Triage Vitals [11/18/21 1037]   BP Temp Temp Source Pulse Resp SpO2 Height Weight   (!) 151/95 98 °F (36.7 °C) Oral 105 20 99 % -- 189 lb (85.7 kg)       Physical Exam  Vitals and nursing note reviewed. Constitutional:       Appearance: She is well-developed. She is not diaphoretic. Comments: Tearful on exam   HENT:      Head: Normocephalic and atraumatic. Right Ear: External ear normal.      Left Ear: External ear normal.      Nose: Nose normal.   Eyes:      General:         Right eye: No discharge. Left eye: No discharge. Neck:      Trachea: No tracheal deviation. Cardiovascular:      Rate and Rhythm: Regular rhythm. Tachycardia present. Pulmonary:      Effort: Pulmonary effort is normal. No respiratory distress. Breath sounds: Normal breath sounds. No wheezing or rales. Musculoskeletal:         General: Normal range of motion. Cervical back: Normal range of motion and neck supple. Comments: Tenderness to palpation to the posterior right calf. There is no overlying erythema, edema, ecchymosis or warmth. Dorsalis pedis and posterior tibial pulse are 2+. Normal sensation light touch. Neurovascularly intact    There is no tenderness noted over the back   Skin:     General: Skin is warm and dry. Neurological:      Mental Status: She is alert and oriented to person, place, and time.    Psychiatric:         Behavior: Behavior normal.         DIAGNOSTIC RESULTS   LABS:    Labs Reviewed   CBC WITH AUTO DIFFERENTIAL - Abnormal; Notable for the following components:       Result Value    WBC 14.7 (*)     RBC 5.35 (*)     Lymphocytes Absolute 6.5 (*)     Atypical Lymphocytes Relative 10 (*) Microcytes Occasional (*)     All other components within normal limits    Narrative:     Performed at:  [facility]  [address] [place], [address]   Phone [phone]   COMPREHENSIVE METABOLIC PANEL - Abnormal; Notable for the following components:    Glucose 108 (*)     Alkaline Phosphatase 157 (*)     All other components within normal limits    Narrative:     Performed at:  [facility] [address],  [place], [address]   Phone [phone]   TROPONIN    Narrative:     Performed at:  [facility] [address],  [place], [address]   Phone [phone]   PROTIME-INR    Narrative:     Performed at:  [facility] [address] [facility] [address] [place],  [place], [address]   Phone [phone]   APTT    Narrative:     Performed at:  [facility] [address] [facility] [address]  [place], [address]   Phone [phone]   HCG, SERUM, QUALITATIVE    Narrative:     Performed at:  [facility] [address] [facility] [address],  [place], [address]   Phone [phone]   CBC   APTT   APTT   APTT       When ordered only abnormal lab results are displayed. All other labs were within normal range or not returned as of this dictation. EKG: When ordered, EKG's are interpreted by the Emergency Department Physician in the absence of a cardiologist.  Please see their note for interpretation of EKG. RADIOLOGY:   Non-plain film images such as CT, Ultrasound and MRI are read by the radiologist. Plain radiographic images are visualized and preliminarily interpreted by the ED Provider with the below findings:        Interpretation per the Radiologist below, if available at the time of this note:    CT CHEST PULMONARY EMBOLISM W CONTRAST   Final Result   Bilateral pulmonary emboli, primarily in lobar vessels, clot burden estimated   to be mild-to-moderate.       No evidence of right ventricular heart strain. Small pericardial effusion. Small right pleural effusion. 3 mm nodule in the right lower lobe. If there are no risk factors, no   follow-up may be necessary. Otherwise, 12 month follow-up is suggested. Critical results were called by Dr. Binta Braga MD to [facility] on   11/18/2021 at 15:21. RECOMMENDATIONS:   Fleischner Society guidelines for follow-up and management of incidentally   detected pulmonary nodules:      Single Solid Nodule:      Nodule size less than 6 mm   In a low-risk patient, no routine follow-up. In a high-risk patient, optional CT at 12 months.      -Low risk patients include individuals with minimal or absent history of   smoking and other known risk factors. - High risk patients include individuals with a history or smoking or known   risk factors. Radiology 2017 http://pubs. rsna.org/doi/full/10.1148/radiol. 6870787827         VL Extremity Venous Right   Final Result        No results found.         PROCEDURES   Unless otherwise noted below, none     Procedures    CRITICAL CARE TIME   N/A    CONSULTS:  IP CONSULT TO HEM/ONC      EMERGENCY DEPARTMENT COURSE and DIFFERENTIAL DIAGNOSIS/MDM:   Vitals:    Vitals:    11/18/21 1037   BP: (!) 151/95   Pulse: 105   Resp: 20   Temp: 98 °F (36.7 °C)   TempSrc: Oral   SpO2: 99%   Weight: 189 lb (85.7 kg)       Patient was given the following medications:  Medications   heparin (porcine) injection 5,000 Units (has no administration in time range)   heparin (porcine) injection 6,860 Units (has no administration in time range)   heparin (porcine) injection 3,430 Units (has no administration in time range)   heparin 25,000 units in dextrose 5% 250 mL (premix) infusion (has no administration in time range)   HYDROcodone-acetaminophen (NORCO) 5-325 MG per tablet 2 tablet (2 tablets Oral Given 11/18/21 1056)   ondansetron (ZOFRAN-ODT) disintegrating tablet 4 mg (4 mg Oral NORETHINDRONE (AYGESTIN) 5 MG TABLET    TAKE 2 BY ORAL ROUTE ONCE DAILY              (Please note that portions of this note were completed with a voice recognition program.  Efforts were made to edit the dictations but occasionally words are mis-transcribed.)    Paul Gonzales PA-C (electronically signed)            Paul Gonzales PA-C  11/18/21 8323

## 2021-11-18 NOTE — ED NOTES
Bed: 14  Expected date:   Expected time:   Means of arrival:   Comments:  Tiffanie Smith RN  11/18/21 1473

## 2021-11-18 NOTE — ED NOTES
RN called 3T and [facility] states that she will let RN know to come down when she is able to take bedside report and take patient upstairs as patient is on heparin gtt and needs nurse escort.      Macey Maradiaga RN  11/18/21 1640

## 2021-11-18 NOTE — ED NOTES
3T RN at bedside. Patient transported upstairs on tele in wheelchair with heparin gtt.      Iris Francis RN  11/18/21 3519

## 2021-11-19 LAB
APTT: 81.8 SEC (ref 26.2–38.6)
EKG ATRIAL RATE: 84 BPM
EKG DIAGNOSIS: NORMAL
EKG P AXIS: 69 DEGREES
EKG P-R INTERVAL: 146 MS
EKG Q-T INTERVAL: 358 MS
EKG QRS DURATION: 78 MS
EKG QTC CALCULATION (BAZETT): 423 MS
EKG R AXIS: -29 DEGREES
EKG T AXIS: 18 DEGREES
EKG VENTRICULAR RATE: 84 BPM
HEMATOLOGY PATH CONSULT: NORMAL

## 2021-11-19 PROCEDURE — APPSS60 APP SPLIT SHARED TIME 46-60 MINUTES: Performed by: NURSE PRACTITIONER

## 2021-11-19 PROCEDURE — APPNB30 APP NON BILLABLE TIME 0-30 MINS: Performed by: NURSE PRACTITIONER

## 2021-11-19 PROCEDURE — 6360000002 HC RX W HCPCS: Performed by: INTERNAL MEDICINE

## 2021-11-19 PROCEDURE — 6370000000 HC RX 637 (ALT 250 FOR IP): Performed by: INTERNAL MEDICINE

## 2021-11-19 PROCEDURE — 99253 IP/OBS CNSLTJ NEW/EST LOW 45: CPT | Performed by: SURGERY

## 2021-11-19 PROCEDURE — 97530 THERAPEUTIC ACTIVITIES: CPT

## 2021-11-19 PROCEDURE — 97116 GAIT TRAINING THERAPY: CPT

## 2021-11-19 PROCEDURE — 2060000000 HC ICU INTERMEDIATE R&B

## 2021-11-19 PROCEDURE — 97161 PT EVAL LOW COMPLEX 20 MIN: CPT

## 2021-11-19 PROCEDURE — 93010 ELECTROCARDIOGRAM REPORT: CPT | Performed by: INTERNAL MEDICINE

## 2021-11-19 PROCEDURE — 6370000000 HC RX 637 (ALT 250 FOR IP): Performed by: HOSPITALIST

## 2021-11-19 PROCEDURE — 85730 THROMBOPLASTIN TIME PARTIAL: CPT

## 2021-11-19 PROCEDURE — 36415 COLL VENOUS BLD VENIPUNCTURE: CPT

## 2021-11-19 PROCEDURE — 2580000003 HC RX 258: Performed by: INTERNAL MEDICINE

## 2021-11-19 RX ORDER — MORPHINE SULFATE 2 MG/ML
1 INJECTION, SOLUTION INTRAMUSCULAR; INTRAVENOUS EVERY 4 HOURS PRN
Status: DISCONTINUED | OUTPATIENT
Start: 2021-11-19 | End: 2021-11-23 | Stop reason: HOSPADM

## 2021-11-19 RX ORDER — IBUPROFEN 400 MG/1
400 TABLET ORAL EVERY 6 HOURS PRN
Status: DISCONTINUED | OUTPATIENT
Start: 2021-11-19 | End: 2021-11-23 | Stop reason: HOSPADM

## 2021-11-19 RX ADMIN — Medication 10 ML: at 20:48

## 2021-11-19 RX ADMIN — HYDROCODONE BITARTRATE AND ACETAMINOPHEN 1 TABLET: 7.5; 325 TABLET ORAL at 19:34

## 2021-11-19 RX ADMIN — HYDROCHLOROTHIAZIDE 25 MG: 25 TABLET ORAL at 08:20

## 2021-11-19 RX ADMIN — HYDROCODONE BITARTRATE AND ACETAMINOPHEN 1 TABLET: 7.5; 325 TABLET ORAL at 12:45

## 2021-11-19 RX ADMIN — HEPARIN SODIUM 18 UNITS/KG/HR: 5000 INJECTION INTRAVENOUS; SUBCUTANEOUS at 09:06

## 2021-11-19 RX ADMIN — HYDROCODONE BITARTRATE AND ACETAMINOPHEN 1 TABLET: 7.5; 325 TABLET ORAL at 07:11

## 2021-11-19 RX ADMIN — Medication 10 ML: at 08:21

## 2021-11-19 RX ADMIN — LOSARTAN POTASSIUM 50 MG: 25 TABLET, FILM COATED ORAL at 08:20

## 2021-11-19 RX ADMIN — HYDROCODONE BITARTRATE AND ACETAMINOPHEN 1 TABLET: 7.5; 325 TABLET ORAL at 00:45

## 2021-11-19 ASSESSMENT — PAIN SCALES - GENERAL
PAINLEVEL_OUTOF10: 7
PAINLEVEL_OUTOF10: 4
PAINLEVEL_OUTOF10: 7
PAINLEVEL_OUTOF10: 9
PAINLEVEL_OUTOF10: 8

## 2021-11-19 ASSESSMENT — PAIN DESCRIPTION - ORIENTATION
ORIENTATION: RIGHT

## 2021-11-19 ASSESSMENT — PAIN DESCRIPTION - PAIN TYPE
TYPE: ACUTE PAIN

## 2021-11-19 ASSESSMENT — PAIN SCALES - WONG BAKER: WONGBAKER_NUMERICALRESPONSE: 8

## 2021-11-19 ASSESSMENT — PAIN DESCRIPTION - DESCRIPTORS: DESCRIPTORS: ACHING;SORE

## 2021-11-19 ASSESSMENT — PAIN - FUNCTIONAL ASSESSMENT: PAIN_FUNCTIONAL_ASSESSMENT: PREVENTS OR INTERFERES SOME ACTIVE ACTIVITIES AND ADLS

## 2021-11-19 ASSESSMENT — PAIN DESCRIPTION - PROGRESSION: CLINICAL_PROGRESSION: GRADUALLY IMPROVING

## 2021-11-19 ASSESSMENT — PAIN DESCRIPTION - ONSET: ONSET: GRADUAL

## 2021-11-19 ASSESSMENT — PAIN DESCRIPTION - LOCATION
LOCATION: LEG

## 2021-11-19 ASSESSMENT — PAIN DESCRIPTION - FREQUENCY: FREQUENCY: INTERMITTENT

## 2021-11-19 NOTE — PROGRESS NOTES
has a past surgical history that includes Mouth surgery. Restrictions  Restrictions/Precautions  Restrictions/Precautions: Fall Risk, Up as Tolerated (high fall risk)  Required Braces or Orthoses?: No  Position Activity Restriction  Other position/activity restrictions: Charisse Aguirre is a 45 y.o. female who presents to the emergency department today for evaluation for back pain, and calf pain on the right. The patient states that she did have a cyst removed from the top of her foot, and she states that this was performed on 11/4/2021. The patient states that she tolerated the procedure well with no abnormalities. The patient states she has been sleeping on the couch secondary to her recent surgery. The patient states that 3 days ago she got up and she thought that she felt a \"pop\" to her mid back. The patient states she did not really think anything of it. The patient states that she is having some pain to her right lower back, and she states that occasionally she will notice pain that is rating down her right leg. The patient states that since last night however she is had worsening pain to her right calf. The patient states that she does not really report much back pain or radiculopathy at this time she is mostly here for concern of pain to her right calf only. She states that she can really walk, cannot secondary to pain. Patient is currently rating her discomfort as a 9/10, pain is worse with ambulation, touching movement. Patient has no chest pain. She has no shortness of breath. She has no fever chills. She has no nausea, vomiting or diarrhea. She denies any fall or injury. She has no bowel or bladder incontinence or retention. No saddle anesthesias.   Patient otherwise has no complaints at this time     Vision/Hearing  Vision: Within Functional Limits  Hearing: Within functional limits       Subjective  General  Chart Reviewed: Yes  Patient assessed for rehabilitation services?: initially unable to place any weight through RLE d/t pain, progressively able to start placing light weight through RLE w/ BUE support via RW. Ambulation  Ambulation?: Yes  More Ambulation?: No  Ambulation 1  Surface: level tile  Device: Rolling Walker  Assistance: Supervision  Distance: 20'  Comments: Patient able to safely ambulate w/ dec'd RLE WB using RW for balance. Stairs/Curb  Stairs?: No     Balance  Posture: Good  Sitting - Static: Good  Sitting - Dynamic: Good  Standing - Static: Good  Standing - Dynamic: Good        Plan   Plan  Times per week: 1-2 (please see Monday 11/22/2021 if here)  Times per day: Daily  Current Treatment Recommendations: Balance Training, Transfer Training, Gait Training, Safety Education & Training, Pain Management  Safety Devices  Type of devices: Call light within reach, Gait belt, Left in bed, Nurse notified  Restraints  Initially in place: No    AM-PAC Score  AM-PAC Inpatient Mobility Raw Score : 22 (11/19/21 1242)  AM-PAC Inpatient T-Scale Score : 53.28 (11/19/21 1242)  Mobility Inpatient CMS 0-100% Score: 20.91 (11/19/21 1242)  Mobility Inpatient CMS G-Code Modifier : Dusty Marinelli (11/19/21 1242)        Goals  Short term goals  Time Frame for Short term goals: Discharge. Short term goal 1: Patient will perform bed-chair transfer MOD I w/ LRAD. Short term goal 2: Patient will ambulate 48' MOD I w/ LRAD. Patient Goals   Patient goals : Decrease right calf pain, walk.        Therapy Time   Individual Concurrent Group Co-treatment   Time In 4030         Time Out 1216         Minutes 39         Timed Code Treatment Minutes: [address], [address] [facility], DPT, ATC-R [id number]

## 2021-11-19 NOTE — PROGRESS NOTES
Hospitalist Progress Note      PCP: Richard Bertrand DO    Date of Admission: 11/18/2021    Chief Complaint: Leg pain    Hospital Course: 45 y.o. female with past medical history of varicose veins, recent history of cyst removed from her right foot, hypertension, mild intermittent asthma, history of liver lesion, history of blood disorder described as elevated white blood cells without known etiology presents to the ER with several days of progressive right posterior calf pain. Patient described pain as soreness, progressing over 3 to 4 days, associated with swelling, associated with worsening of her varicose veins, progressing to the point where she could barely weight-bear on that leg. She denies any chest pain or shortness of breath. She denies any recent travel or prolonged immobilization. He does state that couple of weeks ago she started wearing compression stockings as prescribed by vascular surgeon. At the ED patient found to have DVT of the right lower extremity prompting evaluation with CT chest with PE protocol. That revealed multiple bilateral lobar pulmonary emboli. Patient started on heparin drip. Subjective: Patient seen and examined at bedside. States that her cough pain has been worsening. Continues to be chest pain-free.       Medications:  Reviewed    Infusion Medications    heparin (PORCINE) Infusion 18 Units/kg/hr (11/19/21 0906)    sodium chloride       Scheduled Medications    hydroCHLOROthiazide  25 mg Oral Daily    losartan  50 mg Oral Daily    sodium chloride flush  5-40 mL IntraVENous 2 times per day    influenza virus vaccine  0.5 mL IntraMUSCular Prior to discharge     PRN Meds: morphine, heparin (porcine), heparin (porcine), sodium chloride flush, sodium chloride, ondansetron **OR** ondansetron, polyethylene glycol, acetaminophen **OR** acetaminophen, HYDROcodone-acetaminophen      Intake/Output Summary (Last 24 hours) at 11/19/2021 8447  Last data filed at 11/19/2021 1151  Gross per 24 hour   Intake --   Output 1 ml   Net -1 ml       Physical Exam Performed:    /82   Pulse 94   Temp 97.9 °F (36.6 °C) (Oral)   Resp 24   Ht 5' 2\" (1.575 m)   Wt 191 lb 8 oz (86.9 kg)   SpO2 100%   BMI 35.03 kg/m²     General appearance: No apparent distress, appears stated age and cooperative. HEENT: Pupils equal, round, and reactive to light. Conjunctivae/corneas clear. Neck: Supple, with full range of motion. No jugular venous distention. Trachea midline. Respiratory:  Normal respiratory effort. Clear to auscultation, bilaterally without Rales/Wheezes/Rhonchi. Cardiovascular: Regular rate and rhythm with normal S1/S2 without murmurs, rubs or gallops. Abdomen: Soft, non-tender, non-distended with normal bowel sounds. Musculoskeletal: No clubbing, cyanosis or edema bilaterally. Full range of motion without deformity. Skin: Skin color, texture, turgor normal.  No rashes or lesions. Neurologic:  Neurovascularly intact without any focal sensory/motor deficits.  Cranial nerves: II-XII intact, grossly non-focal.  Psychiatric: Alert and oriented, thought content appropriate, normal insight  Capillary Refill: Brisk,< 3 seconds   Peripheral Pulses: +2 palpable, equal bilaterally       Labs:   Recent Labs     11/18/21  1333 11/18/21  1638   WBC 14.7* 14.0*   HGB 14.5 13.8   HCT 43.4 42.1    166     Recent Labs     11/18/21  1333      K 4.0   CL 99   CO2 25   BUN 15   CREATININE 0.6   CALCIUM 9.6     Recent Labs     11/18/21  1333   AST 24   ALT 26   BILITOT 0.9   ALKPHOS 157*     Recent Labs     11/18/21  1333   INR 0.95     Recent Labs     11/18/21  1333   TROPONINI <0.01       Urinalysis:      Lab Results   Component Value Date    NITRU Negative 01/26/2020    WBCUA 8 01/26/2020    BACTERIA 1+ 01/26/2020    RBCUA 2 01/26/2020    BLOODU LARGE 01/26/2020    SPECGRAV 1.019 01/26/2020    GLUCOSEU Negative 01/26/2020       Radiology:  CT CHEST PULMONARY EMBOLISM W CONTRAST   Final Result   Bilateral pulmonary emboli, primarily in lobar vessels, clot burden estimated   to be mild-to-moderate. No evidence of right ventricular heart strain. Small pericardial effusion. Small right pleural effusion. 3 mm nodule in the right lower lobe. If there are no risk factors, no   follow-up may be necessary. Otherwise, 12 month follow-up is suggested. Critical results were called by Dr. Ariela Trivedi MD to [facility] on   11/18/2021 at 15:21. RECOMMENDATIONS:   Fleischner Society guidelines for follow-up and management of incidentally   detected pulmonary nodules:      Single Solid Nodule:      Nodule size less than 6 mm   In a low-risk patient, no routine follow-up. In a high-risk patient, optional CT at 12 months.      -Low risk patients include individuals with minimal or absent history of   smoking and other known risk factors. - High risk patients include individuals with a history or smoking or known   risk factors. Radiology 2017 http://pubs. rsna.org/doi/full/10.1148/radiol. 5869697636         VL Extremity Venous Right   Final Result              Assessment/Plan:    Active Hospital Problems    Diagnosis     Pulmonary embolism, bilateral ([facility] [address] [I26.99]      Multiple pulmonary emboli  Bilateral  Source right lower extremity DVT  Provoked by compression stocking?   Cyst removal?  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 as per patient  Has been seen by hematology in the past  We will consult hematology for further evaluation  Continue heparin drip  Plan to transition to [address] prior to discharge    Right lower extremity DVT  Patient complains of increasing pain  Continue heparin drip  Vascular surgery consulted     Hypertension  Controlled  Continue home medications     Mild intermittent asthma  Not in exacerbation     Obesity  Patient states that she has been on a weight loss program since April 2021  Reports 45 pound weight loss     History of liver lesion  Patient states that she has been diagnosed with small subcentimeter liver lesion  She has established follow-up with gastroenterology  She was told it is benign  No gastroenterology note available for review     3 mm nodule in the right lower lung lobe  Follow-up in 12 months depending on risk factors    DVT Prophylaxis: Heparin GTT  Diet: ADULT DIET;  Regular  Code Status: Full Code      Electronically signed by Beth Small MD on 11/19/2021 at 3:19 PM

## 2021-11-19 NOTE — PLAN OF CARE
Problem: Pain:  Goal: Pain level will decrease  Description: Pain level will decrease  Outcome: Ongoing  Note: Pt reports pain being controlled with PRN pain medication.    Goal: Control of acute pain  Description: Control of acute pain  Outcome: Ongoing  Goal: Control of chronic pain  Description: Control of chronic pain  Outcome: Ongoing  Goal: Patient's pain/discomfort is manageable  Description: Patient's pain/discomfort is manageable  Outcome: Ongoing

## 2021-11-19 NOTE — CONSULTS
[facility]  Consultation Note    Chief Complaint / Reason for Consultation  Right leg DVT and pain with walking     History of Present Illness  Patient is a 45 y.o. female who presented to the ED yesterday with complaints of pain and swelling to her right leg. Patient underwent a cyst removal to the top of her right foot on 11/4 with Dr. Dayne Ca. She reports since then she has been less active than normal.  She denies any recent travel. No personal or family history of blood clots or clotting disorders. She does have a history of varicose veins and has seen Dr. Ritchie Woodson on 10/14 and was prescribed thigh high 20-30 mmHg compression stockings which she just received last week. She had been wearing them up until Wednesday when she heard a \"pop\" in her right leg and then had pain in her calf. Yesterday she was having trouble walking due to the pain in her right calf. In ED, venous duplex showed acute DVT in right leg involving femoral, popliteal, PT, peroneal and gastroc veins. CTPE chest showed bilateral PE. She denies any chest pain or SOB. She remains hemodynamically stable on room air. We have been consulted for right leg DVT and pain with ambulation. Review of Systems  + right leg swelling, + right calf pain. Denies fevers, chills, chest pain, shortness of breath, nausea, vomiting, hematemesis, diarrhea, constipation, melena, hematochezia, wt changes, vision problems, blindness, hearing problems, facial droop, slurred speech, extremity numbness, dysuria.     Past Medical History:   Diagnosis Date    History of dilatation and curettage 02/17/2020       Past Surgical History:   Procedure Laterality Date    MOUTH SURGERY         No Known Allergies    Social History     Socioeconomic History    Marital status:      Spouse name: Not on file    Number of children: Not on file    Years of education: Not on file    Highest education level: Not on file   Occupational  zone 5-6.    -Acute totally occluding superficial venous thrombosis involving the right    LSV zone 7. CTPE chest 11/18/21:  Impression   Bilateral pulmonary emboli, primarily in lobar vessels, clot burden estimated   to be mild-to-moderate.       No evidence of right ventricular heart strain.       Small pericardial effusion.  Small right pleural effusion.       3 mm nodule in the right lower lobe.  If there are no risk factors, no   follow-up may be necessary.  Otherwise, 12 month follow-up is suggested.             Assessment:   Acute right leg DVT involving femoral, popliteal, PT, peroneal and gastroc veins - likely provoked due to recent surgery, no signs of phlegmasia, palpable pedal pulses  Bilateral PE - no SOB or chest pain, on room air, no evidence of RV strain   Recent cyst removal right foot (11/4) with Dr. Esquivel Branch:  ACE wrap applied for compression to right leg from ball of foot to groin. Keep right leg elevated. Continue Heparin drip. Monitor for improvement of pain and swelling with compression. Already has thigh high 20-30 mmHg compression stockings which I instructed her to continue to wear after discharge. No plans for catheter directed thrombolysis at this time unless symptoms worsen or fail to improve and then would need to have discussion with Dr. Nikunj Gustafson regarding recent surgery and risk of bleeding. Will discuss with Dr. Cecille Hudson and further recommendations to follow. Thank you for the consultation. Patient educated on plan of care and disease process. All questions answered. Electronically signed by WALE Ni CNP on 11/19/2021 at 12:47 PM     Vascular Staff    I independently performed an evaluation on [address] [place] [address]. I have reviewed the above documentation completed by Fiona Townsend CNP. Please see my additional contributions to the HPI, physical exam, assessment, and medical decision making.     44 yo female s/p surgery to right foot 11/4 and presented with marked increased pain and swelling to the right foot. Duplex revealed DVT. No previous personally history. Denies sob  PE:  AF  1+ edema  Right calf tender to palpation  Thigh soft and NT    Duplex reviewed    I:  Acute RLE DVT  PE - asymptomatic  P:  Agree with heparin and transition to oral anticoagulant of choice. Given location of pain and no significant proximal swelling unlikely to benefit from LE thrombolysis and this was discussed with pt and . PE is asymptomatic with no evidence of cor pulmonale and would not recommend pulmonary artery thrombolysis. Ace wrap from base of toes to proximal thigh. Ok to transition to 20-30mmHg thigh high compression once patient able to tolerate. Leg elevation as tolerated. Pt questions answered  Will sign off but please contact me with any additional questions. Sincerely appreciate the consultation. Taylor Jiang M.D., FACS.  11/20/2021  7:17 AM

## 2021-11-19 NOTE — CONSULTS
[facility]   CONSULT NOTE    11/19/2021 10:39 AM    Patient Information: Mary Ellen Kelly   Date of Admit:  11/18/2021  Primary Care Physician:  Luis Burks DO  Requesting Physician:  Francisco Montes MD    Reason for consult:   Evaluation and recommendations for DVT and bilat PE    Chief complaint:    Chief Complaint   Patient presents with    Back Pain     Pt to triage with c/o back pain radiating down to right leg x 3 days- states she went to urgent care yesterday and was told she had a \"strained muscle\" and was given a prescription for a \"muscle relaxer. \"  Pt states medication does not work. History of Present Illness:  Mary Ellen Kelly is a 45 y.o. female on [place] [facility] who was admitted on 11/18/2021 for pain in her right leg, started a couple days ago and was getting progressively worse, to the point where she could not walk on it. Dopplers showed acute partially occluding DVT throughout the right leg. CTPA was then done and showed bilateral pulmonary emboli, no evidence of heart strain. She was started on heparin drip. Patient reports having recent cyst removal surgery on her right foot. She reports being less active since her foot surgery. She denies history of blood clots, no recent plane travel or extended car trips. History of benign liver lesion, follows regularly with Dr. Malcolm Gutierrez. Past Medical History:     has a past medical history of History of dilatation and curettage. Past Surgical History:    Past Surgical History:   Procedure Laterality Date    MOUTH SURGERY          Current Medications:     hydroCHLOROthiazide  25 mg Oral Daily    losartan  50 mg Oral Daily    sodium chloride flush  5-40 mL IntraVENous 2 times per day    influenza virus vaccine  0.5 mL IntraMUSCular Prior to discharge       Allergies:    No Known Allergies     Social History:    reports that she has never smoked.  She has never used smokeless tobacco. She reports that she does not drink alcohol and does not use drugs. Family History:     family history is not on file. ROS:    Constitutional:  Negative for fever, chills or night sweats  Eyes:  Negative for exudate, itching  Ears:  Negative for drainage   Nose:  Negative for rhinorrhea, epistaxis  Mouth/Throat:  Negative for hoarseness, sore throat. Respiratory:   Negative for shortness of breath, hemoptysis, wheezing  Cardiovascular: Negative for chest pain, palpitations   Gastrointestinal:  Negative for nausea, vomiting, diarrhea, black stool, bright red blood in the stool  Genitourinary:  Negative for dysuria, hematuria   Hematologic/Lymphatic:  Negative for  bleeding tendency, easy bruising  Musculoskeletal:  Negative for myalgias, arthralgias  Neurologic:  Negative for  confusion,dysarthria. Skin :  Negative for itching, rash  Psychiatric:  Negative for depression, anxiety. Endocrine:  Negative for polydipsia, polyuria, heat /cold intolerance. PHYSICAL EXAM:    Vitals:  Vitals:    11/19/21 0708   BP: 112/73   Pulse: 79   Resp: 18   Temp: 98.3 °F (36.8 °C)   SpO2: 100%      No intake or output data in the 24 hours ending 11/19/21 1039   Wt Readings from Last 3 Encounters:   11/19/21 191 lb 8 oz (86.9 kg)   10/14/21 198 lb (89.8 kg)   10/04/21 200 lb (90.7 kg)        General appearance: Appears comfortable. Well nourished  Eyes: Sclera clear, pupils equal  ENT: Moist mucus membranes, no thrush  Neck: Trachea midline, symmetrical  Cardiovascular: Regular rhythm, normal S1, S2. No murmur, gallop, rub. No edema in  lower extremities  Respiratory: Clear to auscultation bilaterally. No wheeze. Good inspiratory effort  Gastrointestinal: Abdomen soft, not tender, not distended, normal bowel sounds  Musculoskeletal: No cyanosis in digits, warm extremities  Neurologic: Cranial nerves grossly intact, no motor or speech deficits. Psychiatric: Normal affect. Alert and oriented to time, place and person.   Skin: Warm, dry, normal turgor, no rash  Right leg - calf swollen and tender, bandage on right foot where cyst was removed    DATA:  CBC:   Lab Results   Component Value Date    WBC 14.0 11/18/2021    RBC 5.14 11/18/2021    HGB 13.8 11/18/2021    HCT 42.1 11/18/2021    MCV 81.9 11/18/2021    MCH 26.9 11/18/2021    MCHC 32.9 11/18/2021    RDW 15.0 11/18/2021     11/18/2021    MPV 8.1 11/18/2021     BMP:  Lab Results   Component Value Date     11/18/2021    K 4.0 11/18/2021    K 4.7 01/26/2020    CL 99 11/18/2021    CO2 25 11/18/2021    BUN 15 11/18/2021    CREATININE 0.6 11/18/2021    CALCIUM 9.6 11/18/2021    GFRAA >60 11/18/2021    LABGLOM >60 11/18/2021    GLUCOSE 108 11/18/2021     Magnesium: No results found for: MG  LIVER PROFILE:   Recent Labs     11/18/21  1333   AST 24   ALT 26   BILITOT 0.9   ALKPHOS 157*     PT/INR:    Lab Results   Component Value Date    PROTIME 10.7 11/18/2021    INR 0.95 11/18/2021       IMPRESSION/RECOMMENDATIONS:    Active Problems:    Pulmonary embolism, bilateral (HCC)  Resolved Problems:    * No resolved hospital problems. *       DVT/PE  - possibly provoked d/t recent surgery on right foot   - doppler showing right LE DVT  - CTPA showing bilateral PE, no right heart strain  - on hep gtt  - recommend switching to NOAC when ready for discharge      This plan was discussed with the patient and he/she verbalized understanding. Thank you for allowing us to participate in the care of this patient. Marlys Morel, CNP  [facility]        I agree with the above note. Presented with right lower extremity DVT and bilateral PE. DVT/PE most likely provoked secondary to surgery on ipsilateral leg as well as keeping it stationary for prolonged period  of time. Continue heparin drip while inpatient. At discharge Eliquis or Xarelto for anticoagulation. Patient to follow-up with me as an outpatient. For determining length of anticoagulation.       Guillermo Torres MD  Oncology

## 2021-11-20 LAB
APTT: 54.8 SEC (ref 26.2–38.6)
APTT: 57 SEC (ref 26.2–38.6)
APTT: 67.8 SEC (ref 26.2–38.6)
HCT VFR BLD CALC: 43 % (ref 36–48)
HEMOGLOBIN: 14.4 G/DL (ref 12–16)
MCH RBC QN AUTO: 27.2 PG (ref 26–34)
MCHC RBC AUTO-ENTMCNC: 33.5 G/DL (ref 31–36)
MCV RBC AUTO: 81.1 FL (ref 80–100)
PDW BLD-RTO: 14.8 % (ref 12.4–15.4)
PLATELET # BLD: 185 K/UL (ref 135–450)
PMV BLD AUTO: 8.5 FL (ref 5–10.5)
RBC # BLD: 5.31 M/UL (ref 4–5.2)
WBC # BLD: 10.1 K/UL (ref 4–11)

## 2021-11-20 PROCEDURE — 6370000000 HC RX 637 (ALT 250 FOR IP): Performed by: INTERNAL MEDICINE

## 2021-11-20 PROCEDURE — 2060000000 HC ICU INTERMEDIATE R&B

## 2021-11-20 PROCEDURE — 6360000002 HC RX W HCPCS: Performed by: INTERNAL MEDICINE

## 2021-11-20 PROCEDURE — 36415 COLL VENOUS BLD VENIPUNCTURE: CPT

## 2021-11-20 PROCEDURE — 85730 THROMBOPLASTIN TIME PARTIAL: CPT

## 2021-11-20 PROCEDURE — 2580000003 HC RX 258: Performed by: INTERNAL MEDICINE

## 2021-11-20 PROCEDURE — 6370000000 HC RX 637 (ALT 250 FOR IP): Performed by: HOSPITALIST

## 2021-11-20 PROCEDURE — 85027 COMPLETE CBC AUTOMATED: CPT

## 2021-11-20 RX ADMIN — HEPARIN SODIUM 3460 UNITS: 1000 INJECTION INTRAVENOUS; SUBCUTANEOUS at 07:07

## 2021-11-20 RX ADMIN — Medication 10 ML: at 22:16

## 2021-11-20 RX ADMIN — HEPARIN SODIUM 3430 UNITS: 1000 INJECTION INTRAVENOUS; SUBCUTANEOUS at 22:20

## 2021-11-20 RX ADMIN — HEPARIN SODIUM 20 UNITS/KG/HR: 5000 INJECTION INTRAVENOUS; SUBCUTANEOUS at 19:18

## 2021-11-20 RX ADMIN — HEPARIN SODIUM 22 UNITS/KG/HR: 5000 INJECTION INTRAVENOUS; SUBCUTANEOUS at 22:23

## 2021-11-20 RX ADMIN — MORPHINE SULFATE 1 MG: 2 INJECTION, SOLUTION INTRAMUSCULAR; INTRAVENOUS at 00:31

## 2021-11-20 RX ADMIN — HYDROCODONE BITARTRATE AND ACETAMINOPHEN 1 TABLET: 7.5; 325 TABLET ORAL at 05:31

## 2021-11-20 RX ADMIN — HEPARIN SODIUM 20 UNITS/KG/HR: 5000 INJECTION INTRAVENOUS; SUBCUTANEOUS at 07:09

## 2021-11-20 RX ADMIN — HYDROCHLOROTHIAZIDE 25 MG: 25 TABLET ORAL at 09:27

## 2021-11-20 RX ADMIN — Medication 10 ML: at 09:27

## 2021-11-20 RX ADMIN — MORPHINE SULFATE 1 MG: 2 INJECTION, SOLUTION INTRAMUSCULAR; INTRAVENOUS at 14:54

## 2021-11-20 RX ADMIN — POLYETHYLENE GLYCOL 3350 17 G: 17 POWDER, FOR SOLUTION ORAL at 22:33

## 2021-11-20 RX ADMIN — HYDROCODONE BITARTRATE AND ACETAMINOPHEN 1 TABLET: 7.5; 325 TABLET ORAL at 16:07

## 2021-11-20 RX ADMIN — HYDROCODONE BITARTRATE AND ACETAMINOPHEN 1 TABLET: 7.5; 325 TABLET ORAL at 22:16

## 2021-11-20 RX ADMIN — HEPARIN SODIUM 18 UNITS/KG/HR: 5000 INJECTION INTRAVENOUS; SUBCUTANEOUS at 01:21

## 2021-11-20 RX ADMIN — LOSARTAN POTASSIUM 50 MG: 25 TABLET, FILM COATED ORAL at 09:27

## 2021-11-20 RX ADMIN — MORPHINE SULFATE 1 MG: 2 INJECTION, SOLUTION INTRAMUSCULAR; INTRAVENOUS at 23:47

## 2021-11-20 ASSESSMENT — PAIN SCALES - GENERAL
PAINLEVEL_OUTOF10: 10
PAINLEVEL_OUTOF10: 7
PAINLEVEL_OUTOF10: 7
PAINLEVEL_OUTOF10: 8
PAINLEVEL_OUTOF10: 7
PAINLEVEL_OUTOF10: 8
PAINLEVEL_OUTOF10: 8
PAINLEVEL_OUTOF10: 6

## 2021-11-20 ASSESSMENT — PAIN DESCRIPTION - PAIN TYPE: TYPE: ACUTE PAIN

## 2021-11-20 ASSESSMENT — PAIN DESCRIPTION - ORIENTATION: ORIENTATION: RIGHT

## 2021-11-20 ASSESSMENT — PAIN DESCRIPTION - LOCATION: LOCATION: LEG

## 2021-11-20 ASSESSMENT — PAIN DESCRIPTION - FREQUENCY: FREQUENCY: INTERMITTENT

## 2021-11-20 ASSESSMENT — PAIN DESCRIPTION - DESCRIPTORS: DESCRIPTORS: ACHING;SORE

## 2021-11-20 NOTE — PROGRESS NOTES
Occupational Therapy  Tiffanie Youngblood    Attempted OT evaluation this PM. Pt lying supine in bed upon arrival. Pt c/o 8/10 pain in LE and declined any activity out of bed. Pt declined any additional needs. Will f/u pt tomorrow.     Thanks,  [facility], [facility], OTR/L [id number]

## 2021-11-20 NOTE — PROGRESS NOTES
Hospitalist Progress Note      PCP: Michelle Magallanes DO    Date of Admission: 11/18/2021    Chief Complaint: Leg pain    Hospital Course: 45 y.o. female with past medical history of varicose veins, recent history of cyst removed from her right foot, hypertension, mild intermittent asthma, history of liver lesion, history of blood disorder described as elevated white blood cells without known etiology presents to the ER with several days of progressive right posterior calf pain. Patient described pain as soreness, progressing over 3 to 4 days, associated with swelling, associated with worsening of her varicose veins, progressing to the point where she could barely weight-bear on that leg. She denies any chest pain or shortness of breath. She denies any recent travel or prolonged immobilization. He does state that couple of weeks ago she started wearing compression stockings as prescribed by vascular surgeon. At the ED patient found to have DVT of the right lower extremity prompting evaluation with CT chest with PE protocol. That revealed multiple bilateral lobar pulmonary emboli. Patient started on heparin drip. Subjective: Patient seen and examined at bedside. Continues to have lower leg pain however improving. Continues to be chest pain-free.       Medications:  Reviewed    Infusion Medications    heparin (PORCINE) Infusion 20 Units/kg/hr (11/20/21 0709)    sodium chloride       Scheduled Medications    hydroCHLOROthiazide  25 mg Oral Daily    losartan  50 mg Oral Daily    sodium chloride flush  5-40 mL IntraVENous 2 times per day    influenza virus vaccine  0.5 mL IntraMUSCular Prior to discharge     PRN Meds: morphine, ibuprofen, heparin (porcine), heparin (porcine), sodium chloride flush, sodium chloride, ondansetron **OR** ondansetron, polyethylene glycol, acetaminophen **OR** acetaminophen, HYDROcodone-acetaminophen      Intake/Output Summary (Last 24 hours) at 11/20/2021 1110  Last data filed at 11/19/2021 1151  Gross per 24 hour   Intake --   Output 1 ml   Net -1 ml       Physical Exam Performed:    /64   Pulse 72   Temp 98 °F (36.7 °C) (Oral)   Resp 20   Ht 5' 2\" (1.575 m)   Wt 191 lb 8 oz (86.9 kg)   SpO2 99%   BMI 35.03 kg/m²     General appearance: No apparent distress, appears stated age and cooperative. HEENT: Pupils equal, round, and reactive to light. Conjunctivae/corneas clear. Neck: Supple, with full range of motion. No jugular venous distention. Trachea midline. Respiratory:  Normal respiratory effort. Clear to auscultation, bilaterally without Rales/Wheezes/Rhonchi. Cardiovascular: Regular rate and rhythm with normal S1/S2 without murmurs, rubs or gallops. Abdomen: Soft, non-tender, non-distended with normal bowel sounds. Musculoskeletal: No clubbing, cyanosis or edema bilaterally. Full range of motion without deformity. Skin: Skin color, texture, turgor normal.  No rashes or lesions. Neurologic:  Neurovascularly intact without any focal sensory/motor deficits.  Cranial nerves: II-XII intact, grossly non-focal.  Psychiatric: Alert and oriented, thought content appropriate, normal insight  Capillary Refill: Brisk,< 3 seconds   Peripheral Pulses: +2 palpable, equal bilaterally       Labs:   Recent Labs     11/18/21  1333 11/18/21  1638 11/20/21  0525   WBC 14.7* 14.0* 10.1   HGB 14.5 13.8 14.4   HCT 43.4 42.1 43.0    166 185     Recent Labs     11/18/21  1333      K 4.0   CL 99   CO2 25   BUN 15   CREATININE 0.6   CALCIUM 9.6     Recent Labs     11/18/21  1333   AST 24   ALT 26   BILITOT 0.9   ALKPHOS 157*     Recent Labs     11/18/21  1333   INR 0.95     Recent Labs     11/18/21  1333   TROPONINI <0.01       Urinalysis:      Lab Results   Component Value Date    NITRU Negative 01/26/2020    WBCUA 8 01/26/2020    BACTERIA 1+ 01/26/2020    RBCUA 2 01/26/2020    BLOODU LARGE 01/26/2020    SPECGRAV 1.019 01/26/2020    GLUCOSEU Negative 01/26/2020       Radiology:  CT CHEST PULMONARY EMBOLISM W CONTRAST   Final Result   Bilateral pulmonary emboli, primarily in lobar vessels, clot burden estimated   to be mild-to-moderate. No evidence of right ventricular heart strain. Small pericardial effusion. Small right pleural effusion. 3 mm nodule in the right lower lobe. If there are no risk factors, no   follow-up may be necessary. Otherwise, 12 month follow-up is suggested. Critical results were called by Dr. Ismael Wang MD to [facility] on   11/18/2021 at 15:21. RECOMMENDATIONS:   Fleischner Society guidelines for follow-up and management of incidentally   detected pulmonary nodules:      Single Solid Nodule:      Nodule size less than 6 mm   In a low-risk patient, no routine follow-up. In a high-risk patient, optional CT at 12 months.      -Low risk patients include individuals with minimal or absent history of   smoking and other known risk factors. - High risk patients include individuals with a history or smoking or known   risk factors. Radiology 2017 http://pubs. rsna.org/doi/full/10.1148/radiol. 4967876592         VL Extremity Venous Right   Final Result              Assessment/Plan:    Active Hospital Problems    Diagnosis     Pulmonary embolism, bilateral ([address] [I26.99]      Multiple pulmonary emboli  Bilateral  Source right lower extremity DVT  Provoked by compression stocking?   Cyst removal?  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 as per patient  Has been seen by hematology in the past  We will consult hematology for further evaluation  Continue heparin drip  Plan to transition to [address] prior to discharge    Right lower extremity DVT  Patient complains of increasing pain  Continue heparin drip  Vascular surgery input appreciated     Hypertension  Controlled  Continue home medications     Mild intermittent asthma  Not in exacerbation     Obesity  Patient states that she has been on a weight loss program since April 2021  Reports 45 pound weight loss     History of liver lesion  Patient states that she has been diagnosed with small subcentimeter liver lesion  She has established follow-up with gastroenterology  She was told it is benign  No gastroenterology note available for review     3 mm nodule in the right lower lung lobe  Follow-up in 12 months depending on risk factors    DVT Prophylaxis: Heparin GTT  Diet: ADULT DIET;  Regular  Code Status: Full Code      Electronically signed by Ezequiel Belle MD on 11/20/2021 at 11:10 AM

## 2021-11-21 LAB
ANION GAP SERPL CALCULATED.3IONS-SCNC: 10 MMOL/L (ref 3–16)
APTT: 56.8 SEC (ref 26.2–38.6)
APTT: 65.4 SEC (ref 26.2–38.6)
APTT: 72.7 SEC (ref 26.2–38.6)
BASOPHILS ABSOLUTE: 0.1 K/UL (ref 0–0.2)
BASOPHILS RELATIVE PERCENT: 0.6 %
BUN BLDV-MCNC: 22 MG/DL (ref 7–20)
CALCIUM SERPL-MCNC: 9.5 MG/DL (ref 8.3–10.6)
CHLORIDE BLD-SCNC: 98 MMOL/L (ref 99–110)
CO2: 25 MMOL/L (ref 21–32)
CREAT SERPL-MCNC: <0.5 MG/DL (ref 0.6–1.1)
EOSINOPHILS ABSOLUTE: 0.3 K/UL (ref 0–0.6)
EOSINOPHILS RELATIVE PERCENT: 4 %
GFR AFRICAN AMERICAN: >60
GFR NON-AFRICAN AMERICAN: >60
GLUCOSE BLD-MCNC: 117 MG/DL (ref 70–99)
HCT VFR BLD CALC: 41.8 % (ref 36–48)
HEMOGLOBIN: 13.6 G/DL (ref 12–16)
LYMPHOCYTES ABSOLUTE: 3.8 K/UL (ref 1–5.1)
LYMPHOCYTES RELATIVE PERCENT: 44.5 %
MAGNESIUM: 2 MG/DL (ref 1.8–2.4)
MCH RBC QN AUTO: 26.7 PG (ref 26–34)
MCHC RBC AUTO-ENTMCNC: 32.6 G/DL (ref 31–36)
MCV RBC AUTO: 81.9 FL (ref 80–100)
MONOCYTES ABSOLUTE: 0.8 K/UL (ref 0–1.3)
MONOCYTES RELATIVE PERCENT: 9.4 %
NEUTROPHILS ABSOLUTE: 3.5 K/UL (ref 1.7–7.7)
NEUTROPHILS RELATIVE PERCENT: 41.5 %
PDW BLD-RTO: 14.8 % (ref 12.4–15.4)
PHOSPHORUS: 3.4 MG/DL (ref 2.5–4.9)
PLATELET # BLD: 205 K/UL (ref 135–450)
PMV BLD AUTO: 8.7 FL (ref 5–10.5)
POTASSIUM SERPL-SCNC: 4.2 MMOL/L (ref 3.5–5.1)
RBC # BLD: 5.1 M/UL (ref 4–5.2)
SODIUM BLD-SCNC: 133 MMOL/L (ref 136–145)
WBC # BLD: 8.5 K/UL (ref 4–11)

## 2021-11-21 PROCEDURE — 80048 BASIC METABOLIC PNL TOTAL CA: CPT

## 2021-11-21 PROCEDURE — 84100 ASSAY OF PHOSPHORUS: CPT

## 2021-11-21 PROCEDURE — 2060000000 HC ICU INTERMEDIATE R&B

## 2021-11-21 PROCEDURE — 85025 COMPLETE CBC W/AUTO DIFF WBC: CPT

## 2021-11-21 PROCEDURE — 97535 SELF CARE MNGMENT TRAINING: CPT

## 2021-11-21 PROCEDURE — 97165 OT EVAL LOW COMPLEX 30 MIN: CPT

## 2021-11-21 PROCEDURE — 6370000000 HC RX 637 (ALT 250 FOR IP): Performed by: HOSPITALIST

## 2021-11-21 PROCEDURE — 2580000003 HC RX 258: Performed by: INTERNAL MEDICINE

## 2021-11-21 PROCEDURE — 85730 THROMBOPLASTIN TIME PARTIAL: CPT

## 2021-11-21 PROCEDURE — 6370000000 HC RX 637 (ALT 250 FOR IP): Performed by: INTERNAL MEDICINE

## 2021-11-21 PROCEDURE — 36415 COLL VENOUS BLD VENIPUNCTURE: CPT

## 2021-11-21 PROCEDURE — 83735 ASSAY OF MAGNESIUM: CPT

## 2021-11-21 PROCEDURE — 6360000002 HC RX W HCPCS: Performed by: INTERNAL MEDICINE

## 2021-11-21 RX ADMIN — HEPARIN SODIUM 3430 UNITS: 1000 INJECTION INTRAVENOUS; SUBCUTANEOUS at 07:45

## 2021-11-21 RX ADMIN — HYDROCODONE BITARTRATE AND ACETAMINOPHEN 1 TABLET: 7.5; 325 TABLET ORAL at 06:13

## 2021-11-21 RX ADMIN — HYDROCODONE BITARTRATE AND ACETAMINOPHEN 1 TABLET: 7.5; 325 TABLET ORAL at 19:34

## 2021-11-21 RX ADMIN — HYDROCHLOROTHIAZIDE 25 MG: 25 TABLET ORAL at 09:04

## 2021-11-21 RX ADMIN — MORPHINE SULFATE 1 MG: 2 INJECTION, SOLUTION INTRAMUSCULAR; INTRAVENOUS at 11:39

## 2021-11-21 RX ADMIN — Medication 10 ML: at 09:04

## 2021-11-21 RX ADMIN — LOSARTAN POTASSIUM 50 MG: 25 TABLET, FILM COATED ORAL at 09:04

## 2021-11-21 RX ADMIN — HYDROCODONE BITARTRATE AND ACETAMINOPHEN 1 TABLET: 7.5; 325 TABLET ORAL at 12:54

## 2021-11-21 ASSESSMENT — PAIN DESCRIPTION - LOCATION
LOCATION: LEG

## 2021-11-21 ASSESSMENT — PAIN DESCRIPTION - FREQUENCY: FREQUENCY: INTERMITTENT

## 2021-11-21 ASSESSMENT — PAIN DESCRIPTION - PAIN TYPE: TYPE: ACUTE PAIN

## 2021-11-21 ASSESSMENT — PAIN SCALES - GENERAL
PAINLEVEL_OUTOF10: 9
PAINLEVEL_OUTOF10: 5
PAINLEVEL_OUTOF10: 8
PAINLEVEL_OUTOF10: 0
PAINLEVEL_OUTOF10: 9
PAINLEVEL_OUTOF10: 7
PAINLEVEL_OUTOF10: 8

## 2021-11-21 ASSESSMENT — PAIN DESCRIPTION - ORIENTATION
ORIENTATION: RIGHT

## 2021-11-21 ASSESSMENT — PAIN - FUNCTIONAL ASSESSMENT: PAIN_FUNCTIONAL_ASSESSMENT: PREVENTS OR INTERFERES SOME ACTIVE ACTIVITIES AND ADLS

## 2021-11-21 NOTE — PROGRESS NOTES
Hospitalist Progress Note      PCP: Lexy Sutton DO    Date of Admission: 11/18/2021    Chief Complaint: Leg pain    Hospital Course: 45 y.o. female with past medical history of varicose veins, recent history of cyst removed from her right foot, hypertension, mild intermittent asthma, history of liver lesion, history of blood disorder described as elevated white blood cells without known etiology presents to the ER with several days of progressive right posterior calf pain. Patient described pain as soreness, progressing over 3 to 4 days, associated with swelling, associated with worsening of her varicose veins, progressing to the point where she could barely weight-bear on that leg. She denies any chest pain or shortness of breath. She denies any recent travel or prolonged immobilization. He does state that couple of weeks ago she started wearing compression stockings as prescribed by vascular surgeon. At the ED patient found to have DVT of the right lower extremity prompting evaluation with CT chest with PE protocol. That revealed multiple bilateral lobar pulmonary emboli. Patient started on heparin drip. Subjective: Patient seen and examined at bedside. Continues to have lower leg pain however improving. Continues to be chest pain-free.       Medications:  Reviewed    Infusion Medications    heparin (PORCINE) Infusion 24 Units/kg/hr (11/21/21 8360)    sodium chloride       Scheduled Medications    hydroCHLOROthiazide  25 mg Oral Daily    losartan  50 mg Oral Daily    sodium chloride flush  5-40 mL IntraVENous 2 times per day    influenza virus vaccine  0.5 mL IntraMUSCular Prior to discharge     PRN Meds: morphine, ibuprofen, heparin (porcine), heparin (porcine), sodium chloride flush, sodium chloride, ondansetron **OR** ondansetron, polyethylene glycol, acetaminophen **OR** acetaminophen, HYDROcodone-acetaminophen    No intake or output data in the 24 hours ending 11/21/21 1336    Physical Exam Performed:    /77   Pulse 78   Temp 98.4 °F (36.9 °C) (Oral)   Resp 19   Ht 5' 2\" (1.575 m)   Wt 191 lb 8 oz (86.9 kg)   SpO2 99%   BMI 35.03 kg/m²     General appearance: No apparent distress, appears stated age and cooperative. HEENT: Pupils equal, round, and reactive to light. Conjunctivae/corneas clear. Neck: Supple, with full range of motion. No jugular venous distention. Trachea midline. Respiratory:  Normal respiratory effort. Clear to auscultation, bilaterally without Rales/Wheezes/Rhonchi. Cardiovascular: Regular rate and rhythm with normal S1/S2 without murmurs, rubs or gallops. Abdomen: Soft, non-tender, non-distended with normal bowel sounds. Musculoskeletal: No clubbing, cyanosis or edema bilaterally. Full range of motion without deformity. Skin: Skin color, texture, turgor normal.  No rashes or lesions. Neurologic:  Neurovascularly intact without any focal sensory/motor deficits. Cranial nerves: II-XII intact, grossly non-focal.  Psychiatric: Alert and oriented, thought content appropriate, normal insight  Capillary Refill: Brisk,< 3 seconds   Peripheral Pulses: +2 palpable, equal bilaterally       Labs:   Recent Labs     11/18/21  1638 11/20/21  0525 11/21/21  0615   WBC 14.0* 10.1 8.5   HGB 13.8 14.4 13.6   HCT 42.1 43.0 41.8    185 205     Recent Labs     11/21/21  0615   *   K 4.2   CL 98*   CO2 25   BUN 22*   CREATININE <0.5*   CALCIUM 9.5   PHOS 3.4     No results for input(s): AST, ALT, BILIDIR, BILITOT, ALKPHOS in the last 72 hours. No results for input(s): INR in the last 72 hours. No results for input(s): Varun Pears in the last 72 hours.     Urinalysis:      Lab Results   Component Value Date    NITRU Negative 01/26/2020    WBCUA 8 01/26/2020    BACTERIA 1+ 01/26/2020    RBCUA 2 01/26/2020    BLOODU LARGE 01/26/2020    SPECGRAV 1.019 01/26/2020    GLUCOSEU Negative 01/26/2020       Radiology:  CT CHEST PULMONARY EMBOLISM W CONTRAST   Final Result   Bilateral pulmonary emboli, primarily in lobar vessels, clot burden estimated   to be mild-to-moderate. No evidence of right ventricular heart strain. Small pericardial effusion. Small right pleural effusion. 3 mm nodule in the right lower lobe. If there are no risk factors, no   follow-up may be necessary. Otherwise, 12 month follow-up is suggested. Critical results were called by Dr. Abby Benitez MD to [facility] on   11/18/2021 at 15:21. RECOMMENDATIONS:   Fleischner Society guidelines for follow-up and management of incidentally   detected pulmonary nodules:      Single Solid Nodule:      Nodule size less than 6 mm   In a low-risk patient, no routine follow-up. In a high-risk patient, optional CT at 12 months.      -Low risk patients include individuals with minimal or absent history of   smoking and other known risk factors. - High risk patients include individuals with a history or smoking or known   risk factors. Radiology 2017 http://pubs. rsna.org/doi/full/10.1148/radiol. 4845440105         VL Extremity Venous Right   Final Result      VL Extremity Venous Right    (Results Pending)           Assessment/Plan:    Active Hospital Problems    Diagnosis     Pulmonary embolism, bilateral ([address] [I26.99]      Multiple pulmonary emboli  Bilateral  Source right lower extremity DVT  Provoked by compression stocking?   Cyst removal?  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 as per patient  Has been seen by hematology in the past  We will consult hematology for further evaluation  Continue heparin drip  Plan to transition to [address] prior to discharge    Right lower extremity DVT  Patient complains of increasing pain  Continue heparin drip  Vascular surgery input appreciated     Hypertension  Controlled  Continue home medications     Mild intermittent asthma  Not in exacerbation     Obesity  Patient states that she has been on a weight loss program since April 2021  Reports 45 pound weight loss     History of liver lesion  Patient states that she has been diagnosed with small subcentimeter liver lesion  She has established follow-up with gastroenterology  She was told it is benign  No gastroenterology note available for review     3 mm nodule in the right lower lung lobe  Follow-up in 12 months depending on risk factors    DVT Prophylaxis: Heparin GTT  Diet: ADULT DIET;  Regular  Code Status: Full Code      Electronically signed by Lisa Irizarry MD on 11/21/2021 at 1:36 PM

## 2021-11-21 NOTE — PROGRESS NOTES
Occupational Therapy   Occupational Therapy Initial Assessment and Discharge Summary   Date: 2021   Patient Name: Chika Douglas  MRN: [id number]     : 1970    Date of Service: 2021    Discharge Recommendations: Chika Douglas scored a 24/24 on the [facility] ADL Inpatient form. At this time, no further OT is recommended upon discharge due to indep ADLs . Recommend patient returns to prior setting with prior services. Home with assist PRN  OT Equipment Recommendations  Equipment Needed: Yes  Mobility Devices: Ester [place]: Rolling    Assessment   Assessment: Patient admitted with bilateral PE and right LE DVT. Patient able to perform own ADLs and stated  can help with laundry and IADLs as needed. No further OT indicated but needs a RW upon d/c (uses RW due to right LE pain when ambulating to/from bathroom)  Treatment Diagnosis: Debility due to bilateral PE and right LE DVT  Prognosis: Good  Decision Making: Low Complexity  History: Indep ADLs, works part time as a , lives with   Exam: Indep sponge bathing, indep toileting, indep commode transfers  Assistance / Modification: Stable presentation, uses RW  OT Education: OT Role; Plan of Care; Transfer Training  Patient Education: Patient verbalized understanding  No Skilled OT: Independent with functional mobility; Independent with ADL's; At baseline function; Safe to return home; No OT goals identified  REQUIRES OT FOLLOW UP: No  Activity Tolerance  Activity Tolerance: Patient Tolerated treatment well  Safety Devices  Safety Devices in place: Yes  Type of devices: Call light within reach; Left in bed (moderate fall risk)  Restraints  Initially in place: No           Patient Diagnosis(es): The primary encounter diagnosis was Other acute pulmonary embolism without acute cor pulmonale ([facility] [address]  A diagnosis of Acute deep vein thrombosis (DVT) of proximal vein of right lower extremity (HCC) was also pertinent to this Reviewed: Yes  Patient assessed for rehabilitation services?: Yes  Family / Caregiver Present: No  Diagnosis: Bilateral PE  Subjective  Subjective: Patient supine in bed, pleasant and cooperative. Reports walking to bathroom on own in room. General Comment  Comments: Declined pain meds, stated due in 90 minutes.   Patient Currently in Pain: Yes  Pain Assessment  Pain Level: 7  Pain Type: Acute pain  Pain Location: Leg  Pain Orientation: Right  Pain Frequency: Intermittent  Functional Pain Assessment: Prevents or interferes some active activities and ADLs  Vital Signs  Patient Currently in Pain: Yes  Social/Functional History  Social/Functional History  Lives With: Spouse  Type of Home: House  Home Layout: One level, Laundry in basement  Home Access: Stairs to enter with rails  Entrance Stairs - Number of Steps: 2 small steps to enter house  Entrance Stairs - Rails: Both  Bathroom Shower/Tub: Tub/Shower unit, Shower chair with back  Bathroom Toilet: Standard  Bathroom Equipment: Hand-held shower, Shower chair  Bathroom Accessibility: Walker accessible  ADL Assistance: Independent  Homemaking Assistance: Independent  Homemaking Responsibilities: Yes  Ambulation Assistance: Independent  Transfer Assistance: Independent  Active : Yes  Occupation: Part time employment  Type of occupation: Part time worker as   Leisure & Hobbies: Drawing and gardening  IADL Comments: Lost 45 pounds since April (stated to lose weight because of liver problems)       Objective   Vision: Within Functional Limits  Hearing: Within functional limits    Orientation  Overall Orientation Status: Within Normal Limits  Observation/Palpation  Observation: Patient using RW heavily due to LE pain  Balance  Sitting Balance: Independent  Standing Balance: Modified independent   Standing Balance  Time: @ 10 minutes  Activity: Standing at sink for sponge bathing, ambulating with RW into and out of bathroom  Comment: Modified indep, needing RW due to pain in right LE  Functional Mobility  Functional - Mobility Device: Rolling Walker  Activity: To/from bathroom  Assist Level: Modified independent   Toilet Transfers  Toilet - Technique: Ambulating  Equipment Used: Standard toilet  Toilet Transfer: Independent  ADL  Feeding: Independent  Grooming: Independent  LE Dressing: Independent  Toileting: Independent  Additional Comments: Patient performed sponge bathing standing at sink, indep toileting, indep donning socks and new underwear. No assist needed. Tone RUE  RUE Tone: Normotonic  Tone LUE  LUE Tone: Normotonic  Coordination  Movements Are Fluid And Coordinated: Yes     Bed mobility  Supine to Sit: Modified independent  Sit to Supine: Modified independent  Scooting: Modified independent  Transfers  Sit to stand: Independent  Stand to sit: Independent  Vision - Basic Assessment  Visual History: No significant visual history  Patient Visual Report: No visual complaint reported. Cognition  Overall Cognitive Status: WNL  Perception  Overall Perceptual Status: WFL     Sensation  Overall Sensation Status: WFL        LUE AROM (degrees)  LUE AROM : WNL  Left Hand AROM (degrees)  Left Hand AROM: WNL  RUE AROM (degrees)  RUE AROM : WNL  Right Hand AROM (degrees)  Right Hand AROM: WNL  LUE Strength  Gross LUE Strength: WNL  RUE Strength  Gross RUE Strength:  WNL                   Plan   Plan  Times per week: no further OT indicated    G-Code     OutComes Score                                                  AM-PAC Score        [facility] Inpatient Daily Activity Raw Score: 24 (11/21/21 1044)  AM-PAC Inpatient ADL T-Scale Score : 57.54 (11/21/21 1044)  ADL Inpatient CMS 0-100% Score: 0 (11/21/21 1044)  ADL Inpatient CMS G-Code Modifier : CH (11/21/21 1044)    Goals  Short term goals  Time Frame for Short term goals: No acute care goals indicated  Patient Goals   Patient goals : Return home with        Therapy Time   Individual Concurrent Group Co-treatment   Time In 1018         Time Out 1043         Minutes 25              Timed Code Treatment Minutes:  10 Minutes    Total Treatment Minutes:  [address], OTR/L [address]

## 2021-11-21 NOTE — PLAN OF CARE
Problem: Pain:  Goal: Pain level will decrease  Description: Pain level will decrease  Note: Pain assessed. Patient received relief with medication. Patient offered other alternatives such as distraction. Pain reassessed post medication. Problem: Falls - Risk of:  Goal: Will remain free from falls  Description: Will remain free from falls  Note: Patient free from harm. ID bands on, bed in lowest position, call light in reach. Patient instructed to call for help if needed. Patient educated on ambulation and safety.

## 2021-11-21 NOTE — PROGRESS NOTES
Oncology and Hematology Care   Progress Note    11/21/2021    SUBJECTIVE:  States pain in right leg is worse    OBJECTIVE:    Physical Assessment:  Vitals:  /77   Pulse 78   Temp 98.4 °F (36.9 °C) (Oral)   Resp 19   Ht 5' 2\" (1.575 m)   Wt 191 lb 8 oz (86.9 kg)   SpO2 99%   BMI 35.03 kg/m²    24HR INTAKE/OUTPUT:  No intake or output data in the 24 hours ending 11/21/21 1426    CONSTITUTIONAL:  Awake, alert & oriented x3  RESPIRATORY:  No increased work of breathing, breath sounds decreased. CARDIOVASCULAR:  Cardiac S1/S2, RRR  GASTROINTESTINAL:  abdomen soft +BS x4, no hepatosplenomegaly  SKIN:  negative for rash and skin lesion(s)  MUSCULOSKELETAL:  negative for pain and muscle weakness  EXTREMITIES: Swelling of the right lower extremity      Labs Results:    CBC: Recent Labs     11/18/21  1638 11/20/21  0525 11/21/21  0615   WBC 14.0* 10.1 8.5   HGB 13.8 14.4 13.6   HCT 42.1 43.0 41.8   MCV 81.9 81.1 81.9    185 205     BMP:   Recent Labs     11/21/21  0615   *   K 4.2   CL 98*   CO2 25   PHOS 3.4   BUN 22*   CREATININE <0.5*     LIVER PROFILE: No results for input(s): AST, ALT, LIPASE, BILIDIR, BILITOT, ALKPHOS in the last 72 hours. Invalid input(s): AMYLASE,  ALB  PT/INR:    Lab Results   Component Value Date    PROTIME 10.7 11/18/2021    INR 0.95 11/18/2021     PTT:    Lab Results   Component Value Date    APTT 56.8 11/21/2021    APTT 54.8 11/20/2021    APTT 67.8 11/20/2021     UA:No results for input(s): NITRITE, COLORU, PHUR, LABCAST, WBCUA, RBCUA, MUCUS, TRICHOMONAS, YEAST, BACTERIA, CLARITYU, SPECGRAV, LEUKOCYTESUR, UROBILINOGEN, BILIRUBINUR, BLOODU, GLUCOSEU, AMORPHOUS in the last 72 hours.     Invalid input(s): KETONESU  Magnesium:   Lab Results   Component Value Date    MG 2.00 11/21/2021     ISRRAEL:  No results found for: ANATITER, ISRRAEL    RADIOLOGY:    CT CHEST PULMONARY EMBOLISM W CONTRAST    Result Date: 11/18/2021  EXAMINATION: CTA OF THE CHEST 11/18/2021 2:42 pm TECHNIQUE: CTA of the chest was performed after the administration of intravenous contrast.  Multiplanar reformatted images are provided for review. MIP images are provided for review. Dose modulation, iterative reconstruction, and/or weight based adjustment of the mA/kV was utilized to reduce the radiation dose to as low as reasonably achievable. COMPARISON: CT abdomen pelvis 01/26/2020, 02/17/2021 HISTORY: ORDERING SYSTEM PROVIDED HISTORY: r/o pe TECHNOLOGIST PROVIDED HISTORY: Reason for exam:->r/o pe Decision Support Exception - unselect if not a suspected or confirmed emergency medical condition->Emergency Medical Condition (MA) Reason for Exam: r/o pe Acuity: Unknown Type of Exam: Unknown FINDINGS: Pulmonary Arteries: The main pulmonary arteries are normal in caliber. Bilateral acute emboli are noted. Emboli are noted in multilevel right lobar and segmental vessels. Acute embolus is noted in left upper lobe lobar lingular and descending pulmonary artery extending into at least 2 basal segments. Mediastinum: The right and left ventricle volumes are symmetrical.  No there is no flattening of the intraventricular septum. A trace pericardial effusion is present. There is no mediastinal or hilar adenopathy. Lungs/pleura: A 3 mm nodule is noted in the left lower lobe (image 49, series 3), new since previous studies. Old granulomatous disease is noted as well. There is a small right pleural effusion. No consolidation is identified. Upper Abdomen: There is a 3.8 cm right adrenal adenoma. No follow-up is necessary. Soft Tissues/Bones: Multilevel spondylosis of the thoracic spine. No acute osseous injury. Chest wall is unremarkable. Bilateral pulmonary emboli, primarily in lobar vessels, clot burden estimated to be mild-to-moderate. No evidence of right ventricular heart strain. Small pericardial effusion. Small right pleural effusion. 3 mm nodule in the right lower lobe.   If there are no risk factors, no follow-up may be necessary. Otherwise, 12 month follow-up is suggested. Critical results were called by Dr. Marco Tompkins MD to [facility] on 11/18/2021 at 15:21. RECOMMENDATIONS: Fleischner Society guidelines for follow-up and management of incidentally detected pulmonary nodules: Single Solid Nodule: Nodule size less than 6 mm In a low-risk patient, no routine follow-up. In a high-risk patient, optional CT at 12 months. -Low risk patients include individuals with minimal or absent history of smoking and other known risk factors. - High risk patients include individuals with a history or smoking or known risk factors. Radiology 2017 http://pubs. rsna.org/doi/full/10.1148/radiol. 3218591992     VL Extremity Venous Right    Result Date: 11/18/2021  Lower Extremities DVT Study  Demographics   Patient Name       Ghazala Reeves   Date of Study      11/18/2021         Gender              Female   Patient Number     [id number]         Date of Birth       1970   Visit Number       [id number]          Age                 45 year(s)   Accession Number   [id number]         Room Number         6747   Corporate ID       [id number]            [facility] [address], [place],                                                            [address]   Ordering Physician Ayleen Brasher,    Interpreting        [facility]                     PA-C               Physician           Robyn Dobbins MD,                                                            [facility]  Procedure Type of Study:   Veins:Lower Extremities DVT Study, VL EXTREMITY VENOUS DUPLEX RIGHT. Vascular Sonographer Report  Additional Indications:Pain and swelling Impressions Right Impression Acute partially occluding deep vein thrombosis involving the right mid to distal FV. Acute totally occluding deep vein thrombosis involving the right popliteal vein, PTV's throughout, peroneal veins throughout, and set of gastroc veins zone 5-6.  Acute totally occluding superficial venous thrombosis involving the right LSV zone 7. No other evidence of deep vein or superficial vein thrombosis involving the right lower extremity and the left common femoral vein. Verbal to RN. Conclusions   Summary   -Acute partially occluding deep vein thrombosis involving the right mid to  distal FV. -Acute totally occluding deep vein thrombosis involving the right popliteal  vein, PTV's throughout, peroneal veins throughout, and set of gastroc veins  zone 5-6.  -Acute totally occluding superficial venous thrombosis involving the right  LSV zone 7. Signature   ------------------------------------------------------------------  Electronically signed by Danny Narvaez MD, [facility] (Interpreting  physician) on 11/18/2021 at 01:28 PM  ------------------------------------------------------------------  Patient Status:Routine. [address] [place] [address] - Vascular Lab. Technical Quality:Adequate visualization. Velocities are measured in cm/s ; Diameters are measured in mm Right Lower Extremities DVT Study Measurements Right 2D Measurements +------------------------+----------+---------------+----------+ ! Location                ! Visualized! Compressibility! Thrombosis! +------------------------+----------+---------------+----------+ ! Sapheno Femoral Junction! Yes       ! Yes            ! None      ! +------------------------+----------+---------------+----------+ ! GSV Thigh               ! Yes       ! Yes            ! None      ! +------------------------+----------+---------------+----------+ ! Common Femoral          !Yes       ! Yes            ! None      ! +------------------------+----------+---------------+----------+ ! Prox Femoral            !Yes       ! Yes            ! None      ! +------------------------+----------+---------------+----------+ ! Mid Femoral             !Yes       ! Partial        !Acute     ! +------------------------+----------+---------------+----------+ ! Dist Femoral !Yes       !No             !Acute     ! +------------------------+----------+---------------+----------+ ! Deep Femoral            !Yes       ! Yes            ! None      ! +------------------------+----------+---------------+----------+ ! Popliteal               !Yes       ! No             !Acute     ! +------------------------+----------+---------------+----------+ ! GSV Below Knee          ! Yes       ! Yes            ! None      ! +------------------------+----------+---------------+----------+ ! Gastroc                 ! Yes       ! No             !Acute     ! +------------------------+----------+---------------+----------+ ! PTV                     ! Yes       ! No             !Acute     ! +------------------------+----------+---------------+----------+ ! Peroneal                !Yes       ! No             !Acute     ! +------------------------+----------+---------------+----------+ ! SSV                     ! Yes       ! No             !Acute     ! +------------------------+----------+---------------+----------+ Right Doppler Measurements +------------------------+------+------+------------+ ! Location                ! Signal!Reflux! Reflux (sec)! +------------------------+------+------+------------+ ! Sapheno Femoral Junction! Phasic!      !            ! +------------------------+------+------+------------+ ! Common Femoral          !Phasic!      !            ! +------------------------+------+------+------------+ ! Prox Femoral            !Phasic!      !            ! +------------------------+------+------+------------+ ! Deep Femoral            !Phasic!      !            ! +------------------------+------+------+------------+ ! Popliteal               !Absent!      !            ! +------------------------+------+------+------------+      Current Medications   hydroCHLOROthiazide  25 mg Oral Daily    losartan  50 mg Oral Daily    sodium chloride flush  5-40 mL IntraVENous 2 times per day    influenza virus vaccine  0.5 mL IntraMUSCular Prior to discharge        ASSESSMENT & PLAN:    69-year-old female who presents to the hospital for right lower extremity swelling found to have bilateral PE and right lower extremity DVT    #Provoked PE and DVT secondary to recent right lower extremity surgery and keeping leg stationary   -Duplex of the right lower extremity showing DVT  -CT PE showed bilateral PE with no evidence of right heart strain  -Today patient complaining of worsening pain in the right lower extremity. PLAN  -Continue heparin drip while inpatient  -Given patient is complaining of worsening pain I will repeat right lower extremity duplex to make sure there is no clot propagation despite being on heparin drip  -If above showing resolving clot continue heparin drip while inpatient and at discharge switch to [address]      I have discussed the above stated plan with the patient and they verbalized understanding and agreed with the plan. Thank you for allowing us to participate in this patients care. I have discussed the above stated plan with the patient and they verbalized understanding and agreed with the plan. Thank you for allowing us to participate in this patients care.     Mela Chen MD, 11/21/2021, 2:26 PM

## 2021-11-22 LAB
ANION GAP SERPL CALCULATED.3IONS-SCNC: 11 MMOL/L (ref 3–16)
APTT: 85.3 SEC (ref 26.2–38.6)
BASOPHILS ABSOLUTE: 0.1 K/UL (ref 0–0.2)
BASOPHILS RELATIVE PERCENT: 1.1 %
BUN BLDV-MCNC: 18 MG/DL (ref 7–20)
CALCIUM SERPL-MCNC: 9.9 MG/DL (ref 8.3–10.6)
CHLORIDE BLD-SCNC: 101 MMOL/L (ref 99–110)
CO2: 25 MMOL/L (ref 21–32)
CREAT SERPL-MCNC: <0.5 MG/DL (ref 0.6–1.1)
EOSINOPHILS ABSOLUTE: 0.4 K/UL (ref 0–0.6)
EOSINOPHILS RELATIVE PERCENT: 4.9 %
GFR AFRICAN AMERICAN: >60
GFR NON-AFRICAN AMERICAN: >60
GLUCOSE BLD-MCNC: 131 MG/DL (ref 70–99)
HCT VFR BLD CALC: 41.6 % (ref 36–48)
HEMOGLOBIN: 13.8 G/DL (ref 12–16)
LYMPHOCYTES ABSOLUTE: 3.9 K/UL (ref 1–5.1)
LYMPHOCYTES RELATIVE PERCENT: 51.5 %
MAGNESIUM: 2.1 MG/DL (ref 1.8–2.4)
MCH RBC QN AUTO: 27.2 PG (ref 26–34)
MCHC RBC AUTO-ENTMCNC: 33.2 G/DL (ref 31–36)
MCV RBC AUTO: 81.9 FL (ref 80–100)
MONOCYTES ABSOLUTE: 0.7 K/UL (ref 0–1.3)
MONOCYTES RELATIVE PERCENT: 9.8 %
NEUTROPHILS ABSOLUTE: 2.5 K/UL (ref 1.7–7.7)
NEUTROPHILS RELATIVE PERCENT: 32.7 %
PDW BLD-RTO: 14.9 % (ref 12.4–15.4)
PHOSPHORUS: 4.1 MG/DL (ref 2.5–4.9)
PLATELET # BLD: 209 K/UL (ref 135–450)
PMV BLD AUTO: 8.4 FL (ref 5–10.5)
POTASSIUM SERPL-SCNC: 4.3 MMOL/L (ref 3.5–5.1)
RBC # BLD: 5.08 M/UL (ref 4–5.2)
SODIUM BLD-SCNC: 137 MMOL/L (ref 136–145)
WBC # BLD: 7.5 K/UL (ref 4–11)

## 2021-11-22 PROCEDURE — 6370000000 HC RX 637 (ALT 250 FOR IP): Performed by: INTERNAL MEDICINE

## 2021-11-22 PROCEDURE — 85025 COMPLETE CBC W/AUTO DIFF WBC: CPT

## 2021-11-22 PROCEDURE — 36415 COLL VENOUS BLD VENIPUNCTURE: CPT

## 2021-11-22 PROCEDURE — 80048 BASIC METABOLIC PNL TOTAL CA: CPT

## 2021-11-22 PROCEDURE — 85730 THROMBOPLASTIN TIME PARTIAL: CPT

## 2021-11-22 PROCEDURE — 94760 N-INVAS EAR/PLS OXIMETRY 1: CPT

## 2021-11-22 PROCEDURE — 6370000000 HC RX 637 (ALT 250 FOR IP): Performed by: HOSPITALIST

## 2021-11-22 PROCEDURE — 2580000003 HC RX 258: Performed by: INTERNAL MEDICINE

## 2021-11-22 PROCEDURE — 6360000002 HC RX W HCPCS: Performed by: INTERNAL MEDICINE

## 2021-11-22 PROCEDURE — 2060000000 HC ICU INTERMEDIATE R&B

## 2021-11-22 PROCEDURE — 84100 ASSAY OF PHOSPHORUS: CPT

## 2021-11-22 PROCEDURE — 93971 EXTREMITY STUDY: CPT

## 2021-11-22 PROCEDURE — 83735 ASSAY OF MAGNESIUM: CPT

## 2021-11-22 RX ORDER — HYDROCODONE BITARTRATE AND ACETAMINOPHEN 7.5; 325 MG/1; MG/1
1 TABLET ORAL EVERY 6 HOURS PRN
Qty: 15 TABLET | Refills: 0 | Status: SHIPPED | OUTPATIENT
Start: 2021-11-22 | End: 2021-11-25

## 2021-11-22 RX ADMIN — Medication 10 ML: at 20:51

## 2021-11-22 RX ADMIN — HYDROCHLOROTHIAZIDE 25 MG: 25 TABLET ORAL at 08:12

## 2021-11-22 RX ADMIN — HYDROCODONE BITARTRATE AND ACETAMINOPHEN 1 TABLET: 7.5; 325 TABLET ORAL at 14:15

## 2021-11-22 RX ADMIN — MORPHINE SULFATE 1 MG: 2 INJECTION, SOLUTION INTRAMUSCULAR; INTRAVENOUS at 05:41

## 2021-11-22 RX ADMIN — HYDROCODONE BITARTRATE AND ACETAMINOPHEN 1 TABLET: 7.5; 325 TABLET ORAL at 08:12

## 2021-11-22 RX ADMIN — LOSARTAN POTASSIUM 50 MG: 25 TABLET, FILM COATED ORAL at 08:12

## 2021-11-22 RX ADMIN — HYDROCODONE BITARTRATE AND ACETAMINOPHEN 1 TABLET: 7.5; 325 TABLET ORAL at 01:44

## 2021-11-22 RX ADMIN — Medication 10 ML: at 10:00

## 2021-11-22 RX ADMIN — HYDROCODONE BITARTRATE AND ACETAMINOPHEN 1 TABLET: 7.5; 325 TABLET ORAL at 22:49

## 2021-11-22 RX ADMIN — SODIUM CHLORIDE, PRESERVATIVE FREE 10 ML: 5 INJECTION INTRAVENOUS at 05:41

## 2021-11-22 ASSESSMENT — PAIN DESCRIPTION - DESCRIPTORS
DESCRIPTORS: ACHING;CRAMPING;SORE
DESCRIPTORS: ACHING;SORE;SPASM
DESCRIPTORS: ACHING;SORE

## 2021-11-22 ASSESSMENT — PAIN SCALES - GENERAL
PAINLEVEL_OUTOF10: 7
PAINLEVEL_OUTOF10: 6
PAINLEVEL_OUTOF10: 8
PAINLEVEL_OUTOF10: 6
PAINLEVEL_OUTOF10: 3
PAINLEVEL_OUTOF10: 7
PAINLEVEL_OUTOF10: 9
PAINLEVEL_OUTOF10: 7
PAINLEVEL_OUTOF10: 6
PAINLEVEL_OUTOF10: 0
PAINLEVEL_OUTOF10: 7
PAINLEVEL_OUTOF10: 6

## 2021-11-22 ASSESSMENT — PAIN DESCRIPTION - LOCATION
LOCATION: LEG

## 2021-11-22 ASSESSMENT — PAIN DESCRIPTION - ORIENTATION
ORIENTATION: RIGHT

## 2021-11-22 ASSESSMENT — PAIN DESCRIPTION - PROGRESSION
CLINICAL_PROGRESSION: GRADUALLY IMPROVING
CLINICAL_PROGRESSION: GRADUALLY IMPROVING

## 2021-11-22 ASSESSMENT — PAIN DESCRIPTION - FREQUENCY
FREQUENCY: INTERMITTENT

## 2021-11-22 ASSESSMENT — PAIN DESCRIPTION - ONSET
ONSET: ON-GOING
ONSET: ON-GOING

## 2021-11-22 ASSESSMENT — PAIN - FUNCTIONAL ASSESSMENT
PAIN_FUNCTIONAL_ASSESSMENT: PREVENTS OR INTERFERES SOME ACTIVE ACTIVITIES AND ADLS
PAIN_FUNCTIONAL_ASSESSMENT: PREVENTS OR INTERFERES SOME ACTIVE ACTIVITIES AND ADLS

## 2021-11-22 ASSESSMENT — PAIN DESCRIPTION - PAIN TYPE
TYPE: ACUTE PAIN

## 2021-11-22 ASSESSMENT — PAIN SCALES - WONG BAKER: WONGBAKER_NUMERICALRESPONSE: 4

## 2021-11-22 NOTE — PROGRESS NOTES
Oncology Hematology Care   Progress Note      11/22/2021 8:15 AM        Name: Elkin Nagel . Admitted: 11/18/2021    SUBJECTIVE:  She states right leg pain has improved, pain is worse when she walks on that leg, compression stocking in place. No SOB or chest pain. Reviewed interval ancillary notes    Current Medications  morphine (PF) injection 1 mg, Q4H PRN  ibuprofen (ADVIL;MOTRIN) tablet 400 mg, Q6H PRN  heparin (porcine) injection 6,860 Units, PRN  heparin (porcine) injection 3,430 Units, PRN  heparin 25,000 units in dextrose 5% 250 mL (premix) infusion, Continuous  hydroCHLOROthiazide (HYDRODIURIL) tablet 25 mg, Daily  losartan (COZAAR) tablet 50 mg, Daily  sodium chloride flush 0.9 % injection 5-40 mL, 2 times per day  sodium chloride flush 0.9 % injection 5-40 mL, PRN  0.9 % sodium chloride infusion, PRN  ondansetron (ZOFRAN-ODT) disintegrating tablet 4 mg, Q8H PRN   Or  ondansetron (ZOFRAN) injection 4 mg, Q6H PRN  polyethylene glycol (GLYCOLAX) packet 17 g, Daily PRN  acetaminophen (TYLENOL) tablet 650 mg, Q6H PRN   Or  acetaminophen (TYLENOL) suppository 650 mg, Q6H PRN  HYDROcodone-acetaminophen (NORCO) 7.5-325 MG per tablet 1 tablet, Q6H PRN  influenza quadrivalent split vaccine (FLUZONE;FLUARIX;FLULAVAL;AFLURIA) injection 0.5 mL, Prior to discharge        Objective:  BP (!) 99/55   Pulse 71   Temp 98.3 °F (36.8 °C) (Oral)   Resp 16   Ht 5' 2\" (1.575 m)   Wt 191 lb 8 oz (86.9 kg)   SpO2 99%   BMI 35.03 kg/m²   No intake or output data in the 24 hours ending 11/22/21 0815   Wt Readings from Last 3 Encounters:   11/19/21 191 lb 8 oz (86.9 kg)   10/14/21 198 lb (89.8 kg)   10/04/21 200 lb (90.7 kg)       General appearance:  Appears comfortable  Eyes: Sclera clear. Pupils equal.  ENT: Moist oral mucosa. Trachea midline, no adenopathy. Cardiovascular: Regular rhythm, normal S1, S2. No murmur. Respiratory: Not using accessory muscles. Good inspiratory effort.  Clear to

## 2021-11-22 NOTE — PLAN OF CARE
Pt remains free from falls & injuries; pt satisfied with pain relief from PRN Norco.  Right leg remains elevated. Heparin has been therapeutic x2. Pt remains able to express needs & uses call light appropriately. Call light within reach.     Problem: Pain:  Goal: Pain level will decrease  Description: Pain level will decrease  Outcome: Ongoing  Goal: Control of acute pain  Description: Control of acute pain  Outcome: Ongoing  Goal: Control of chronic pain  Description: Control of chronic pain  Outcome: Ongoing  Goal: Patient's pain/discomfort is manageable  Description: Patient's pain/discomfort is manageable  Outcome: Ongoing     Problem: Falls - Risk of:  Goal: Will remain free from falls  Description: Will remain free from falls  Outcome: Ongoing  Goal: Absence of physical injury  Description: Absence of physical injury  Outcome: Ongoing     Problem: Infection:  Goal: Will remain free from infection  Description: Will remain free from infection  Outcome: Ongoing     Problem: Safety:  Goal: Free from accidental physical injury  Description: Free from accidental physical injury  Outcome: Ongoing  Goal: Free from intentional harm  Description: Free from intentional harm  Outcome: Ongoing     Problem: Daily Care:  Goal: Daily care needs are met  Description: Daily care needs are met  Outcome: Ongoing     Problem: Skin Integrity:  Goal: Skin integrity will stabilize  Description: Skin integrity will stabilize  Outcome: Ongoing     Problem: Discharge Planning:  Goal: Patients continuum of care needs are met  Description: Patients continuum of care needs are met  Outcome: Ongoing

## 2021-11-22 NOTE — PLAN OF CARE
Problem: Pain:  Goal: Pain level will decrease  11/22/2021 1625 by Gissel Lake RN  Outcome: Ongoing     Problem: Falls - Risk of:  Goal: Will remain free from falls  11/22/2021 1625 by Gissel Lake RN  Outcome: Ongoing     Problem: Safety:  Goal: Free from accidental physical injury  11/22/2021 1625 by Gissel Lake RN  Outcome: Ongoing

## 2021-11-22 NOTE — PROGRESS NOTES
Hospitalist Progress Note      PCP: Emily Lerma DO    Date of Admission: 11/18/2021    Chief Complaint: Leg pain    Hospital Course: 45 y.o. female with past medical history of varicose veins, recent history of cyst removed from her right foot, hypertension, mild intermittent asthma, history of liver lesion, history of blood disorder described as elevated white blood cells without known etiology presents to the ER with several days of progressive right posterior calf pain. Patient described pain as soreness, progressing over 3 to 4 days, associated with swelling, associated with worsening of her varicose veins, progressing to the point where she could barely weight-bear on that leg. She denies any chest pain or shortness of breath. She denies any recent travel or prolonged immobilization. He does state that couple of weeks ago she started wearing compression stockings as prescribed by vascular surgeon. At the ED patient found to have DVT of the right lower extremity prompting evaluation with CT chest with PE protocol. That revealed multiple bilateral lobar pulmonary emboli. Patient started on heparin drip. Subjective: Patient seen and examined at bedside. Continues to have lower leg pain however improving. Continues to be chest pain-free.       Medications:  Reviewed    Infusion Medications    heparin (PORCINE) Infusion 24 Units/kg/hr (11/21/21 2204)    sodium chloride       Scheduled Medications    hydroCHLOROthiazide  25 mg Oral Daily    losartan  50 mg Oral Daily    sodium chloride flush  5-40 mL IntraVENous 2 times per day    influenza virus vaccine  0.5 mL IntraMUSCular Prior to discharge     PRN Meds: morphine, ibuprofen, heparin (porcine), heparin (porcine), sodium chloride flush, sodium chloride, ondansetron **OR** ondansetron, polyethylene glycol, acetaminophen **OR** acetaminophen, HYDROcodone-acetaminophen    No intake or output data in the 24 hours ending 11/22/21 1249    Physical Exam Performed:    BP 90/63   Pulse 78   Temp 97.9 °F (36.6 °C) (Oral)   Resp 16   Ht 5' 2\" (1.575 m)   Wt 191 lb 8 oz (86.9 kg)   SpO2 99%   BMI 35.03 kg/m²     General appearance: No apparent distress, appears stated age and cooperative. HEENT: Pupils equal, round, and reactive to light. Conjunctivae/corneas clear. Neck: Supple, with full range of motion. No jugular venous distention. Trachea midline. Respiratory:  Normal respiratory effort. Clear to auscultation, bilaterally without Rales/Wheezes/Rhonchi. Cardiovascular: Regular rate and rhythm with normal S1/S2 without murmurs, rubs or gallops. Abdomen: Soft, non-tender, non-distended with normal bowel sounds. Musculoskeletal: No clubbing, cyanosis or edema bilaterally. Full range of motion without deformity. Skin: Skin color, texture, turgor normal.  No rashes or lesions. Neurologic:  Neurovascularly intact without any focal sensory/motor deficits. Cranial nerves: II-XII intact, grossly non-focal.  Psychiatric: Alert and oriented, thought content appropriate, normal insight  Capillary Refill: Brisk,< 3 seconds   Peripheral Pulses: +2 palpable, equal bilaterally       Labs:   Recent Labs     11/20/21  0525 11/21/21  0615 11/22/21  0611   WBC 10.1 8.5 7.5   HGB 14.4 13.6 13.8   HCT 43.0 41.8 41.6    205 209     Recent Labs     11/21/21  0615 11/22/21  0611   * 137   K 4.2 4.3   CL 98* 101   CO2 25 25   BUN 22* 18   CREATININE <0.5* <0.5*   CALCIUM 9.5 9.9   PHOS 3.4 4.1     No results for input(s): AST, ALT, BILIDIR, BILITOT, ALKPHOS in the last 72 hours. No results for input(s): INR in the last 72 hours. No results for input(s): Anh Dross in the last 72 hours.     Urinalysis:      Lab Results   Component Value Date    NITRU Negative 01/26/2020    WBCUA 8 01/26/2020    BACTERIA 1+ 01/26/2020    RBCUA 2 01/26/2020    BLOODU LARGE 01/26/2020    SPECGRAV 1.019 01/26/2020    GLUCOSEU Negative 01/26/2020 Radiology:  CT CHEST PULMONARY EMBOLISM W CONTRAST   Final Result   Bilateral pulmonary emboli, primarily in lobar vessels, clot burden estimated   to be mild-to-moderate. No evidence of right ventricular heart strain. Small pericardial effusion. Small right pleural effusion. 3 mm nodule in the right lower lobe. If there are no risk factors, no   follow-up may be necessary. Otherwise, 12 month follow-up is suggested. Critical results were called by Dr. Edward Kennedy MD to [facility] on   11/18/2021 at 15:21. RECOMMENDATIONS:   Fleischner Society guidelines for follow-up and management of incidentally   detected pulmonary nodules:      Single Solid Nodule:      Nodule size less than 6 mm   In a low-risk patient, no routine follow-up. In a high-risk patient, optional CT at 12 months.      -Low risk patients include individuals with minimal or absent history of   smoking and other known risk factors. - High risk patients include individuals with a history or smoking or known   risk factors. Radiology 2017 http://pubs. rsna.org/doi/full/10.1148/radiol. 9485563027         VL Extremity Venous Right   Final Result      VL Extremity Venous Right    (Results Pending)           Assessment/Plan:    Active Hospital Problems    Diagnosis     Pulmonary embolism, bilateral ([address] [I26.99]      Multiple pulmonary emboli  Bilateral  Source right lower extremity DVT  Provoked by compression stocking?   Cyst removal?  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 as per patient  Has been seen by hematology in the past  We will consult hematology for further evaluation  Continue heparin drip  Plan to transition to [address] prior to discharge    Right lower extremity DVT  Patient complains of increasing pain  Continue heparin drip  Vascular surgery input appreciated     Hypertension  Controlled  Continue home medications     Mild intermittent asthma  Not in exacerbation     Obesity  Patient states that she has been on a weight loss program since April 2021  Reports 45 pound weight loss     History of liver lesion  Patient states that she has been diagnosed with small subcentimeter liver lesion  She has established follow-up with gastroenterology  She was told it is benign  No gastroenterology note available for review     3 mm nodule in the right lower lung lobe  Follow-up in 12 months depending on risk factors    DVT Prophylaxis: Heparin GTT  Diet: ADULT DIET;  Regular  Code Status: Full Code      Electronically signed by Santiago Meza MD on 11/22/2021 at 12:49 PM

## 2021-11-22 NOTE — PROGRESS NOTES
Physical Therapy      Hold per nsg. Pt to remain with leg elevated at this time. Pending Doppler to assess clot propagation. Will follow-up another day as appropriate.    Thanks, Kari Tapia, DPT [id number]

## 2021-11-22 NOTE — PROGRESS NOTES
CLINICAL PHARMACY NOTE: MEDS TO BEDS    Total # of Prescriptions Filled: 1   The following medications were delivered to the patient:  · Eliquis 5 mg    Additional Documentation:    Delivered to Patient=signed  Lena Atkinson CPhT

## 2021-11-23 VITALS
BODY MASS INDEX: 35.24 KG/M2 | SYSTOLIC BLOOD PRESSURE: 134 MMHG | TEMPERATURE: 97.9 F | DIASTOLIC BLOOD PRESSURE: 89 MMHG | WEIGHT: 191.5 LBS | HEART RATE: 85 BPM | HEIGHT: 62 IN | OXYGEN SATURATION: 98 % | RESPIRATION RATE: 16 BRPM

## 2021-11-23 LAB
ANION GAP SERPL CALCULATED.3IONS-SCNC: 11 MMOL/L (ref 3–16)
APTT: 97.1 SEC (ref 26.2–38.6)
BASOPHILS ABSOLUTE: 0.1 K/UL (ref 0–0.2)
BASOPHILS RELATIVE PERCENT: 0.9 %
BUN BLDV-MCNC: 17 MG/DL (ref 7–20)
CALCIUM SERPL-MCNC: 9.8 MG/DL (ref 8.3–10.6)
CHLORIDE BLD-SCNC: 99 MMOL/L (ref 99–110)
CO2: 26 MMOL/L (ref 21–32)
CREAT SERPL-MCNC: <0.5 MG/DL (ref 0.6–1.1)
EOSINOPHILS ABSOLUTE: 0.4 K/UL (ref 0–0.6)
EOSINOPHILS RELATIVE PERCENT: 4.9 %
GFR AFRICAN AMERICAN: >60
GFR NON-AFRICAN AMERICAN: >60
GLUCOSE BLD-MCNC: 117 MG/DL (ref 70–99)
HCT VFR BLD CALC: 40.7 % (ref 36–48)
HEMOGLOBIN: 13.6 G/DL (ref 12–16)
LYMPHOCYTES ABSOLUTE: 4.2 K/UL (ref 1–5.1)
LYMPHOCYTES RELATIVE PERCENT: 55.8 %
MAGNESIUM: 2.1 MG/DL (ref 1.8–2.4)
MCH RBC QN AUTO: 27 PG (ref 26–34)
MCHC RBC AUTO-ENTMCNC: 33.5 G/DL (ref 31–36)
MCV RBC AUTO: 80.4 FL (ref 80–100)
MONOCYTES ABSOLUTE: 0.7 K/UL (ref 0–1.3)
MONOCYTES RELATIVE PERCENT: 9 %
NEUTROPHILS ABSOLUTE: 2.2 K/UL (ref 1.7–7.7)
NEUTROPHILS RELATIVE PERCENT: 29.4 %
PDW BLD-RTO: 14.5 % (ref 12.4–15.4)
PHOSPHORUS: 3.9 MG/DL (ref 2.5–4.9)
PLATELET # BLD: 252 K/UL (ref 135–450)
PMV BLD AUTO: 8.7 FL (ref 5–10.5)
POTASSIUM SERPL-SCNC: 4.1 MMOL/L (ref 3.5–5.1)
RBC # BLD: 5.06 M/UL (ref 4–5.2)
SODIUM BLD-SCNC: 136 MMOL/L (ref 136–145)
WBC # BLD: 7.6 K/UL (ref 4–11)

## 2021-11-23 PROCEDURE — 97530 THERAPEUTIC ACTIVITIES: CPT

## 2021-11-23 PROCEDURE — 36415 COLL VENOUS BLD VENIPUNCTURE: CPT

## 2021-11-23 PROCEDURE — 84100 ASSAY OF PHOSPHORUS: CPT

## 2021-11-23 PROCEDURE — 83735 ASSAY OF MAGNESIUM: CPT

## 2021-11-23 PROCEDURE — 85025 COMPLETE CBC W/AUTO DIFF WBC: CPT

## 2021-11-23 PROCEDURE — 80048 BASIC METABOLIC PNL TOTAL CA: CPT

## 2021-11-23 PROCEDURE — 85730 THROMBOPLASTIN TIME PARTIAL: CPT

## 2021-11-23 PROCEDURE — 2580000003 HC RX 258: Performed by: INTERNAL MEDICINE

## 2021-11-23 PROCEDURE — 6370000000 HC RX 637 (ALT 250 FOR IP): Performed by: HOSPITALIST

## 2021-11-23 PROCEDURE — 97116 GAIT TRAINING THERAPY: CPT

## 2021-11-23 PROCEDURE — 6370000000 HC RX 637 (ALT 250 FOR IP): Performed by: INTERNAL MEDICINE

## 2021-11-23 RX ADMIN — HYDROCODONE BITARTRATE AND ACETAMINOPHEN 1 TABLET: 7.5; 325 TABLET ORAL at 07:56

## 2021-11-23 RX ADMIN — HYDROCODONE BITARTRATE AND ACETAMINOPHEN 1 TABLET: 7.5; 325 TABLET ORAL at 14:44

## 2021-11-23 RX ADMIN — Medication 10 ML: at 12:02

## 2021-11-23 RX ADMIN — HYDROCHLOROTHIAZIDE 25 MG: 25 TABLET ORAL at 11:49

## 2021-11-23 RX ADMIN — LOSARTAN POTASSIUM 50 MG: 25 TABLET, FILM COATED ORAL at 11:49

## 2021-11-23 ASSESSMENT — PAIN DESCRIPTION - PAIN TYPE
TYPE: ACUTE PAIN

## 2021-11-23 ASSESSMENT — PAIN DESCRIPTION - DESCRIPTORS
DESCRIPTORS: TIGHTNESS

## 2021-11-23 ASSESSMENT — PAIN DESCRIPTION - ORIENTATION
ORIENTATION: RIGHT

## 2021-11-23 ASSESSMENT — PAIN DESCRIPTION - FREQUENCY
FREQUENCY: INTERMITTENT
FREQUENCY: INTERMITTENT

## 2021-11-23 ASSESSMENT — PAIN SCALES - GENERAL
PAINLEVEL_OUTOF10: 3
PAINLEVEL_OUTOF10: 4
PAINLEVEL_OUTOF10: 4
PAINLEVEL_OUTOF10: 6
PAINLEVEL_OUTOF10: 3
PAINLEVEL_OUTOF10: 3

## 2021-11-23 ASSESSMENT — PAIN DESCRIPTION - ONSET: ONSET: OTHER (COMMENT)

## 2021-11-23 ASSESSMENT — PAIN DESCRIPTION - LOCATION
LOCATION: LEG

## 2021-11-23 ASSESSMENT — PAIN - FUNCTIONAL ASSESSMENT: PAIN_FUNCTIONAL_ASSESSMENT: ACTIVITIES ARE NOT PREVENTED

## 2021-11-23 NOTE — PLAN OF CARE
Pt continues to have pain in right leg, more with movement. Pt is satisfied with relief provided by PRN Norco every 6 hours. Pt &  did express concerns r/t risk for bleeding when pt changes from Heparin drip to Eliquis by mouth. Risk for bleeding/bruising discussed with pt & . Pt  satisfied. Pt expressed concern related to resuming normal activities when discharged. Pt advised that she will be able to resume activities as tolerated, unless otherwise directed by the hospitalist or vascular. Pt understands that all discharge instructions, including medication, will be in her discharge packet. Pt &  stated that pt would like to go home with walker & pain medication. Pt remains free from falls & injuries. Pt remains able to express needs and remains actively involved in care & treatment. Pt denies SOB & chest pain. Call light within reach.       Problem: Pain:  Goal: Pain level will decrease  Description: Pain level will decrease  11/23/2021 0140 by Toya Wright RN  Outcome: Ongoing    Problem: Falls - Risk of:  Goal: Will remain free from falls  Description: Will remain free from falls  11/23/2021 0140 by Toya Wright RN  Outcome: Ongoing     Problem: Infection:  Goal: Will remain free from infection  Description: Will remain free from infection  Outcome: Ongoing     Problem: Safety:  Goal: Free from accidental physical injury  Description: Free from accidental physical injury  11/23/2021 0140 by Toya Wright RN  Outcome: Ongoing  11/22/2021 1625 by Lisa Villegas RN  Outcome: Ongoing  Goal: Free from intentional harm  Description: Free from intentional harm  Outcome: Ongoing     Problem: Daily Care:  Goal: Daily care needs are met  Description: Daily care needs are met  Outcome: Ongoing     Problem: Skin Integrity:  Goal: Skin integrity will stabilize  Description: Skin integrity will stabilize  Outcome: Ongoing     Problem: Discharge Planning:  Goal: Patients continuum of care needs are met  Description: Patients continuum of care needs are met  Outcome: Ongoing   Goal: Control of acute pain  Description: Control of acute pain  Outcome: Ongoing  Goal: Control of chronic pain  Description: Control of chronic pain  Outcome: Ongoing  Goal: Patient's pain/discomfort is manageable  Description: Patient's pain/discomfort is manageable  Outcome: Ongoing

## 2021-11-23 NOTE — PROGRESS NOTES
Physical Therapy  Facility/Department: [address]  Daily Treatment Note  NAME: Kym Leonardo  : 1970  MRN: [id number]    Date of Service: 2021    Discharge Recommendations:Claribel Payne scored a 22/24 on the AM-PAC short mobility form. At this time, no further PT is recommended upon discharge due to improved mobility with further pain control and resolution of DVT. Recommend patient returns to prior setting with prior services. Home with assist PRN   PT Equipment Recommendations  Equipment Needed: Yes  Mobility Devices: Leydi Salines: Rolling    Assessment   Body structures, Functions, Activity limitations: Decreased functional mobility ; Increased pain  Assessment: Pt presents with improved overall tolerance to mobility, demonstrating increased ambulation distance and decreased pain with mobility. Pt continues to be limited overall by increased RLE pain, primarily with weightbearing and dependent positioning. Pt would benefit from continued skilled acute PT services to promote safe return to PLOF. Anticipate return to independence with improved pain control and therapeutic anticoagulation after return to home envrionement. Treatment Diagnosis: functional mobility deficits secondary to RLE DVT/pain  Prognosis: Excellent  Exam: functional mobility assessment, ROM, MMT  Clinical Presentation: Evolving. PT Education: Goals; PT Role; Plan of Care; Transfer Training; Gait Training; Functional Mobility Training  Patient Education: Patient verbalized understanding. Barriers to Learning: None evident. REQUIRES PT FOLLOW UP: Yes  Activity Tolerance  Activity Tolerance: Patient Tolerated treatment well; Patient limited by pain     Patient Diagnosis(es): The primary encounter diagnosis was Other acute pulmonary embolism without acute cor pulmonale ([facility] [address]  A diagnosis of Acute deep vein thrombosis (DVT) of proximal vein of right lower extremity (HCC) was also pertinent to this visit.     has a past medical history of History of dilatation and curettage. has a past surgical history that includes Mouth surgery. Restrictions  Restrictions/Precautions  Restrictions/Precautions: Fall Risk, Up as Tolerated (Moderate fall risk)  Required Braces or Orthoses?: No  Position Activity Restriction  Other position/activity restrictions: Charisse Aguirre is a 45 y.o. female who presents to the emergency department today for evaluation for back pain, and calf pain on the right. The patient states that she did have a cyst removed from the top of her foot, and she states that this was performed on 11/4/2021. The patient states that she tolerated the procedure well with no abnormalities. The patient states she has been sleeping on the couch secondary to her recent surgery. The patient states that 3 days ago she got up and she thought that she felt a \"pop\" to her mid back. The patient states she did not really think anything of it. The patient states that she is having some pain to her right lower back, and she states that occasionally she will notice pain that is rating down her right leg. The patient states that since last night however she is had worsening pain to her right calf. The patient states that she does not really report much back pain or radiculopathy at this time she is mostly here for concern of pain to her right calf only. She states that she can really walk, cannot secondary to pain. Patient is currently rating her discomfort as a 9/10, pain is worse with ambulation, touching movement. Patient has no chest pain. She has no shortness of breath. She has no fever chills. She has no nausea, vomiting or diarrhea. She denies any fall or injury. She has no bowel or bladder incontinence or retention. No saddle anesthesias.   Patient otherwise has no complaints at this time  Subjective   General  Chart Reviewed: Yes  Response To Previous Treatment: Patient with no complaints from previous session. Family / Caregiver Present: Yes ()  Subjective  Subjective: Patient reports right calf pain in gravity dependent posture. General Comment  Comments: Patient supine in bed w/ HOB elevated. Pain Screening  Patient Currently in Pain: Yes  Pain Assessment  Pain Assessment: 0-10  Pain Level: 3  Vital Signs  Patient Currently in Pain: Yes       Orientation  Orientation  Overall Orientation Status: Within Normal Limits  Cognition      Objective   Bed mobility  Supine to Sit: Modified independent  Sit to Supine: Modified independent  Scooting: Modified independent  Transfers  Sit to Stand: Supervision  Stand to sit: Supervision  Ambulation  Ambulation?: Yes  Ambulation 1  Surface: level tile  Device: Rolling Walker  Assistance: Supervision  Quality of Gait: Pt ambulates with decreased step length, decreased RLE weightbearing, with increased weightbearing through BUE during stance phase on R, no LOB. Distance: ~50 ft  Stairs/Curb  Stairs?: No (PT educating pt on technique for stair navigation, sequencing with LLE to ascend steps with BHR and RLE to descend. Pt verbalized understanding but declined attempting this date)     Balance  Posture: Good  Sitting - Static: Good  Sitting - Dynamic: Good  Standing - Static: Good  Standing - Dynamic: Good                           G-Code     OutComes Score                                                     AM-PAC Score  AM-PAC Inpatient Mobility Raw Score : 22 (11/23/21 1036)  AM-PAC Inpatient T-Scale Score : 53.28 (11/23/21 1036)  Mobility Inpatient CMS 0-100% Score: 20.91 (11/23/21 1036)  Mobility Inpatient CMS G-Code Modifier : CJ (11/23/21 1036)          Goals  Short term goals  Time Frame for Short term goals: Discharge. Short term goal 1: Patient will perform bed-chair transfer MOD I w/ LRAD. Short term goal 2: Patient will ambulate 48' MOD I w/ LRAD. Patient Goals   Patient goals : Decrease right calf pain, walk.    **none met    Plan Plan  Times per week: 1-2  Times per day: Daily  Current Treatment Recommendations: Balance Training, Transfer Training, Gait Training, Safety Education & Training, Pain Management  Safety Devices  Type of devices:  All fall risk precautions in place, Call light within reach, Gait belt, Left in bed, Nurse notified  Restraints  Initially in place: No     Therapy Time   Individual Concurrent Group Co-treatment   Time In 0951         Time Out 1014         Minutes 23         Timed Code Treatment Minutes: [address], [address] [facility], DPT, [id number]  Milla Costello, PT

## 2021-11-23 NOTE — PROGRESS NOTES
Data- discharge order received, pt verbalized agreement to discharge, disposition to previous residence, no needs for HHC/DME. Action- discharge instructions prepared and given to pt and batsheva, pt verbalized understanding. Medication information packet given r/t NEW and/or CHANGED prescriptions emphasizing name/purpose/side effects, pt verbalized understanding. Discharge instruction summary: Diet- general, Activity- as tolerated, Primary Care Physician as follows: Emery Pfeiffer -[phone] f/u appointment in 2 weeks,prescription medications filled [facility] . Inpatient surgical procedure precautions reviewed:      1. WEIGHT: Admit Weight: 189 lb (85.7 kg) (11/18/21 1037)        Today  Weight: 191 lb 8 oz (86.9 kg) (11/19/21 0802)       2. O2 SAT.: SpO2: 98 % (11/23/21 1145)    Response- Pt belongings gathered, IV removed. Disposition is home rolling walker provided at discharge, transported with belongings, taken to lobby via w/c w/ staff, no complications.

## 2021-12-02 NOTE — DISCHARGE SUMMARY
Hospital Medicine Discharge Summary    Patient ID: Mian Miner      Patient's PCP: Marielle Sarabia DO    Admit Date: 11/18/2021     Discharge Date: 11/23/2021      Admitting Physician: Yanique Ohara MD     Discharge Physician: Srini Henderson MD     Discharge Diagnoses: Active Hospital Problems    Diagnosis     Pulmonary embolism, bilateral ([facility] [address] [I26.99]        The patient was seen and examined on day of discharge and this discharge summary is in conjunction with any daily progress note from day of discharge. Hospital Course:     45 y. o. female with past medical history of varicose veins, recent history of cyst removed from her right foot, hypertension, mild intermittent asthma, history of liver lesion, history of blood disorder described as elevated white blood cells without known etiology presents to the ER with several days of progressive right posterior calf pain.  Patient described pain as soreness, progressing over 3 to 4 days, associated with swelling, associated with worsening of her varicose veins, progressing to the point where she could barely weight-bear on that leg.  She denies any chest pain or shortness of breath.  She denies any recent travel or prolonged immobilization.  He does state that couple of weeks ago she started wearing compression stockings as prescribed by vascular surgeon. Berta Howe the ED patient found to have DVT of the right lower extremity prompting evaluation with CT chest with PE protocol.  That revealed multiple bilateral lobar pulmonary emboli.  Patient started on heparin drip.      Multiple pulmonary emboli  Bilateral  Source right lower extremity DVT  Provoked by compression stocking?  Cyst removal?  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 as per patient  Has been seen by hematology in the past  We will consult hematology for further evaluation  Continue heparin drip  Plan to transition to DOAC prior to discharge     Right lower extremity DVT  Patient complains of increasing pain  Continue heparin drip  Vascular surgery input appreciated     Hypertension  Controlled  Continue home medications     Mild intermittent asthma  Not in exacerbation     Obesity  Patient states that she has been on a weight loss program since April 2021  Reports 45 pound weight loss     History of liver lesion  Patient states that she has been diagnosed with small subcentimeter liver lesion  She has established follow-up with gastroenterology  She was told it is benign  No gastroenterology note available for review     3 mm nodule in the right lower lung lobe  Follow-up in 12 months depending on risk factors      Physical Exam Performed:     /89   Pulse 85   Temp 97.9 °F (36.6 °C) (Oral)   Resp 16   Ht 5' 2\" (1.575 m)   Wt 191 lb 8 oz (86.9 kg)   SpO2 98%   BMI 35.03 kg/m²       General appearance:  No apparent distress, appears stated age and cooperative. HEENT:  Normal cephalic, atraumatic without obvious deformity. Pupils equal, round, and reactive to light. Extra ocular muscles intact. Conjunctivae/corneas clear. Neck: Supple, with full range of motion. No jugular venous distention. Trachea midline. Respiratory:  Normal respiratory effort. Clear to auscultation, bilaterally without Rales/Wheezes/Rhonchi. Cardiovascular:  Regular rate and rhythm with normal S1/S2 without murmurs, rubs or gallops. Abdomen: Soft, non-tender, non-distended with normal bowel sounds. Musculoskeletal:  No clubbing, cyanosis or edema bilaterally. Full range of motion without deformity. Skin: Skin color, texture, turgor normal.  No rashes or lesions. Neurologic:  Neurovascularly intact without any focal sensory/motor deficits.  Cranial nerves: II-XII intact, grossly non-focal.  Psychiatric:  Alert and oriented, thought content appropriate, normal insight  Capillary Refill: Brisk,< 3 seconds   Peripheral Pulses: +2 palpable, equal bilaterally       Labs: For convenience and continuity at follow-up the following most recent labs are provided:      CBC:    Lab Results   Component Value Date    WBC 7.6 11/23/2021    HGB 13.6 11/23/2021    HCT 40.7 11/23/2021     11/23/2021       Renal:    Lab Results   Component Value Date     11/23/2021    K 4.1 11/23/2021    K 4.7 01/26/2020    CL 99 11/23/2021    CO2 26 11/23/2021    BUN 17 11/23/2021    CREATININE <0.5 11/23/2021    CALCIUM 9.8 11/23/2021    PHOS 3.9 11/23/2021         Significant Diagnostic Studies    Radiology:   VL Extremity Venous Right   Final Result      CT CHEST PULMONARY EMBOLISM W CONTRAST   Final Result   Bilateral pulmonary emboli, primarily in lobar vessels, clot burden estimated   to be mild-to-moderate. No evidence of right ventricular heart strain. Small pericardial effusion. Small right pleural effusion. 3 mm nodule in the right lower lobe. If there are no risk factors, no   follow-up may be necessary. Otherwise, 12 month follow-up is suggested. Critical results were called by Dr. Mily Castro MD to [facility] on   11/18/2021 at 15:21. RECOMMENDATIONS:   Fleischner Society guidelines for follow-up and management of incidentally   detected pulmonary nodules:      Single Solid Nodule:      Nodule size less than 6 mm   In a low-risk patient, no routine follow-up. In a high-risk patient, optional CT at 12 months.      -Low risk patients include individuals with minimal or absent history of   smoking and other known risk factors. - High risk patients include individuals with a history or smoking or known   risk factors. Radiology 2017 http://pubs. rsna.org/doi/full/10.1148/radiol. 7160163483         VL Extremity Venous Right   Final Result             Consults:     IP CONSULT TO HEM/ONC  IP CONSULT TO VASCULAR SURGERY    Disposition: Home    Condition at Discharge: Stable    Discharge Instructions/Follow-up: PCP  Vascular surgery    Code Status:  Prior     Activity: activity as tolerated    Diet: cardiac diet      Discharge Medications:     Discharge Medication List as of 11/23/2021  2:33 PM           Details   apixaban (ELIQUIS) 5 MG TABS tablet Take 2 tablets by mouth 2 times daily for 7 days, THEN 1 tablet 2 times daily. , Disp-180 tablet, R-1Normal      HYDROcodone-acetaminophen (NORCO) 7.5-325 MG per tablet Take 1 tablet by mouth every 6 hours as needed for Pain for up to 3 days. , Disp-15 tablet, R-0Normal      influenza quadrivalent split vaccine (FLUZONE;FLUARIX;FLULAVAL;AFLURIA) 0.5 ML injection Inject 0.5 mLs into the muscle once for 1 dose, Disp-0.5 mL, R-0Normal              Details   Cholecalciferol (VITAMIN D3) 125 MCG (5000 UT) TABS Take by mouthHistorical Med      amLODIPine (NORVASC) 5 MG tablet Take 5 mg by mouth dailyHistorical Med      potassium chloride (MICRO-K) 10 MEQ extended release capsule Historical Med      losartan (COZAAR) 50 MG tablet TAKE 1 TABLET BY MOUTH EVERY DAYHistorical Med      hydroCHLOROthiazide (HYDRODIURIL) 25 MG tablet Historical Med             Time Spent on discharge is more than 30 minutes in the examination, evaluation, counseling and review of medications and discharge plan.       Signed:    Electronically signed by Srini Henderson MD on 12/2/2021 at 6:31 PM

## 2021-12-16 DIAGNOSIS — I83.893 SYMPTOMATIC VARICOSE VEINS OF BOTH LOWER EXTREMITIES: Primary | ICD-10-CM

## 2021-12-17 ENCOUNTER — PROCEDURE VISIT (OUTPATIENT)
Dept: VASCULAR SURGERY | Age: 51
End: 2021-12-17
Payer: COMMERCIAL

## 2021-12-17 ENCOUNTER — OFFICE VISIT (OUTPATIENT)
Dept: VASCULAR SURGERY | Age: 51
End: 2021-12-17
Payer: COMMERCIAL

## 2021-12-17 VITALS — WEIGHT: 189 LBS | BODY MASS INDEX: 34.78 KG/M2 | HEIGHT: 62 IN

## 2021-12-17 DIAGNOSIS — I83.893 SYMPTOMATIC VARICOSE VEINS OF BOTH LOWER EXTREMITIES: ICD-10-CM

## 2021-12-17 DIAGNOSIS — I82.411 ACUTE DEEP VEIN THROMBOSIS (DVT) OF FEMORAL VEIN OF RIGHT LOWER EXTREMITY (HCC): Primary | ICD-10-CM

## 2021-12-17 PROCEDURE — 1111F DSCHRG MED/CURRENT MED MERGE: CPT | Performed by: SURGERY

## 2021-12-17 PROCEDURE — 1036F TOBACCO NON-USER: CPT | Performed by: SURGERY

## 2021-12-17 PROCEDURE — 3017F COLORECTAL CA SCREEN DOC REV: CPT | Performed by: SURGERY

## 2021-12-17 PROCEDURE — G8484 FLU IMMUNIZE NO ADMIN: HCPCS | Performed by: SURGERY

## 2021-12-17 PROCEDURE — G8427 DOCREV CUR MEDS BY ELIG CLIN: HCPCS | Performed by: SURGERY

## 2021-12-17 PROCEDURE — G8417 CALC BMI ABV UP PARAM F/U: HCPCS | Performed by: SURGERY

## 2021-12-17 PROCEDURE — 93970 EXTREMITY STUDY: CPT | Performed by: SURGERY

## 2021-12-17 PROCEDURE — 99212 OFFICE O/P EST SF 10 MIN: CPT | Performed by: SURGERY

## 2021-12-17 NOTE — PROGRESS NOTES
Seen back for symptomatic varicose veins bilateral legs. On 11/19/2021 pt was hospitalized at [facility] with femoropopliteal-tibial DVT and has been on Eliquis since then (managed by ANTONIO HERNANDEZ). R leg is feeling better with stocking use and AC. Pt has been wearing the prescribed TH 20/30 mmhg stockings primarily for DVT benefit and bilaterally. No reported edema, bleeding, tender cord, skin changes or ulceration. Recent venous reflux scan performed on 12/17/2021 at [facility]. EXAM:  Slight R leg edema without ulceration or dermatitis. All VVs soft, nontender without erythema. VRS - subacute R FPT DVT: NO measurable B GSV or LSV reflux    A/P: 1) Femoropopliteal-tibial DVT R leg - symptomatically improved   2) No significant B superficial venous reflux - B branch VVs   At this time would not proceed with any venous intervention considering her recent DVT. Because of the lack of axial reflux she is not a standard candidate for intervention and would require stab  phlebectomies outside of standard insurance. I have recommended she observe her varicose veins and spider veins. She may return to see us in 6 to 12 months for reevaluation should she so desire. Continue compression stockings as directed and follow management recommendations of [facility].

## 2022-02-18 ENCOUNTER — HOSPITAL ENCOUNTER (OUTPATIENT)
Dept: VASCULAR LAB | Age: 52
Discharge: HOME | End: 2022-02-18
Payer: COMMERCIAL

## 2022-02-18 ENCOUNTER — HOSPITAL ENCOUNTER (EMERGENCY)
Age: 52
Discharge: HOME | End: 2022-02-18
Attending: EMERGENCY MEDICINE
Payer: COMMERCIAL

## 2022-02-18 VITALS
DIASTOLIC BLOOD PRESSURE: 89 MMHG | SYSTOLIC BLOOD PRESSURE: 137 MMHG | BODY MASS INDEX: 33.11 KG/M2 | RESPIRATION RATE: 18 BRPM | HEART RATE: 84 BPM | WEIGHT: 181 LBS | OXYGEN SATURATION: 99 % | TEMPERATURE: 98 F

## 2022-02-18 DIAGNOSIS — I82.451 ACUTE DEEP VEIN THROMBOSIS (DVT) OF RIGHT PERONEAL VEIN (HCC): Primary | ICD-10-CM

## 2022-02-18 DIAGNOSIS — M79.604 PAIN OF RIGHT LEG: Primary | ICD-10-CM

## 2022-02-18 LAB
A/G RATIO: 1.4 (ref 1.1–2.2)
ALBUMIN SERPL-MCNC: 4.6 G/DL (ref 3.4–5)
ALP BLD-CCNC: 111 U/L (ref 40–129)
ALT SERPL-CCNC: 26 U/L (ref 10–40)
ANION GAP SERPL CALCULATED.3IONS-SCNC: 15 MMOL/L (ref 3–16)
APTT: 40.4 SEC (ref 26.2–38.6)
AST SERPL-CCNC: 28 U/L (ref 15–37)
BASOPHILS ABSOLUTE: 0.1 K/UL (ref 0–0.2)
BASOPHILS RELATIVE PERCENT: 1 %
BILIRUB SERPL-MCNC: 0.5 MG/DL (ref 0–1)
BUN BLDV-MCNC: 17 MG/DL (ref 7–20)
CALCIUM SERPL-MCNC: 10 MG/DL (ref 8.3–10.6)
CHLORIDE BLD-SCNC: 99 MMOL/L (ref 99–110)
CO2: 22 MMOL/L (ref 21–32)
CREAT SERPL-MCNC: 0.6 MG/DL (ref 0.6–1.1)
EOSINOPHILS ABSOLUTE: 0 K/UL (ref 0–0.6)
EOSINOPHILS RELATIVE PERCENT: 0 %
GFR AFRICAN AMERICAN: >60
GFR NON-AFRICAN AMERICAN: >60
GLUCOSE BLD-MCNC: 104 MG/DL (ref 70–99)
HCT VFR BLD CALC: 46.9 % (ref 36–48)
HEMATOLOGY PATH CONSULT: NO
HEMOGLOBIN: 15.3 G/DL (ref 12–16)
INR BLD: 0.91 (ref 0.88–1.12)
LYMPHOCYTES ABSOLUTE: 6.2 K/UL (ref 1–5.1)
LYMPHOCYTES RELATIVE PERCENT: 54 %
MCH RBC QN AUTO: 26.7 PG (ref 26–34)
MCHC RBC AUTO-ENTMCNC: 32.6 G/DL (ref 31–36)
MCV RBC AUTO: 81.9 FL (ref 80–100)
MONOCYTES ABSOLUTE: 0.7 K/UL (ref 0–1.3)
MONOCYTES RELATIVE PERCENT: 6 %
NEUTROPHILS ABSOLUTE: 4.4 K/UL (ref 1.7–7.7)
NEUTROPHILS RELATIVE PERCENT: 39 %
PDW BLD-RTO: 15.2 % (ref 12.4–15.4)
PLATELET # BLD: 245 K/UL (ref 135–450)
PLATELET SLIDE REVIEW: ADEQUATE
PMV BLD AUTO: 8.1 FL (ref 5–10.5)
POTASSIUM REFLEX MAGNESIUM: 4.1 MMOL/L (ref 3.5–5.1)
PROTHROMBIN TIME: 10.2 SEC (ref 9.9–12.7)
RBC # BLD: 5.72 M/UL (ref 4–5.2)
RBC # BLD: NORMAL 10*6/UL
SLIDE REVIEW: ABNORMAL
SODIUM BLD-SCNC: 136 MMOL/L (ref 136–145)
TOTAL PROTEIN: 8 G/DL (ref 6.4–8.2)
WBC # BLD: 11.4 K/UL (ref 4–11)

## 2022-02-18 PROCEDURE — 6360000002 HC RX W HCPCS: Performed by: EMERGENCY MEDICINE

## 2022-02-18 PROCEDURE — 85730 THROMBOPLASTIN TIME PARTIAL: CPT

## 2022-02-18 PROCEDURE — 99283 EMERGENCY DEPT VISIT LOW MDM: CPT

## 2022-02-18 PROCEDURE — 85025 COMPLETE CBC W/AUTO DIFF WBC: CPT

## 2022-02-18 PROCEDURE — 93971 EXTREMITY STUDY: CPT

## 2022-02-18 PROCEDURE — 96372 THER/PROPH/DIAG INJ SC/IM: CPT

## 2022-02-18 PROCEDURE — 36415 COLL VENOUS BLD VENIPUNCTURE: CPT

## 2022-02-18 PROCEDURE — 85610 PROTHROMBIN TIME: CPT

## 2022-02-18 PROCEDURE — 80053 COMPREHEN METABOLIC PANEL: CPT

## 2022-02-18 RX ADMIN — ENOXAPARIN SODIUM 80 MG: 80 INJECTION SUBCUTANEOUS at 17:25

## 2022-02-18 ASSESSMENT — PAIN - FUNCTIONAL ASSESSMENT: PAIN_FUNCTIONAL_ASSESSMENT: 0-10

## 2022-02-18 ASSESSMENT — PAIN SCALES - GENERAL: PAINLEVEL_OUTOF10: 1

## 2022-02-18 ASSESSMENT — PAIN DESCRIPTION - DESCRIPTORS: DESCRIPTORS: NUMBNESS

## 2022-02-18 ASSESSMENT — PAIN DESCRIPTION - LOCATION: LOCATION: LEG

## 2022-02-18 ASSESSMENT — PAIN DESCRIPTION - ORIENTATION: ORIENTATION: RIGHT

## 2022-02-18 NOTE — ED PROVIDER NOTES
[facility]        Pt Name: Carlos Hagen  MRN: [id number]  [place] 1970  Date of evaluation: 2/18/2022  Provider: Nicholas Edward MD  PCP: Roshan Bah DO    This patient was seen and evaluated by the attending physician Nicholas Edward MD.      [address]       Chief Complaint   Patient presents with    Other     pt coming in from home, c/o leg heaviness. went to pcp had doppler studies done. positive for dvt rt leg. pt is on eliquis for previous blood clot       HISTORY OF PRESENT ILLNESS   (Location/Symptom, Timing/Onset, Context/Setting, Quality, Duration, Modifying Factors, Severity)  Note limiting factors. Carlos Hagen is a 45 y.o. female on 5 mg of Eliquis p.o. twice daily at baseline for prior DVT and back in November comes in today with a chief complaint of pain to her right leg. Had an outpatient Doppler study today demonstrated a new right lower extremity DVT with resolution of her old right femoral DVT. No other complaints. No Fevers chills sweats no nausea or diarrhea no headaches chest pain dyspnea. Anticoagulation followed by [facility]. Nursing Notes were all reviewed and agreed with or any disagreements were addressed  in the HPI. REVIEW OF SYSTEMS    (2-9 systems for level 4, 10 or more for level 5)     Review of Systems    Positives and Pertinent negatives as per HPI. Except as noted abovein the ROS, all other systems were reviewed and negative.        PAST MEDICAL HISTORY     Past Medical History:   Diagnosis Date    Disease of blood and blood forming organ     DVT of popliteal vein (HCC)     History of dilatation and curettage 02/17/2020         SURGICAL HISTORY     Past Surgical History:   Procedure Laterality Date    MOUTH SURGERY           CURRENTMEDICATIONS       Previous Medications    AMLODIPINE (NORVASC) 5 MG TABLET    Take 5 mg by mouth daily    APIXABAN (ELIQUIS) 5 MG TABS TABLET    Take 2 tablets by mouth 2 times daily for 7 days, THEN 1 tablet 2 times daily. CHOLECALCIFEROL (VITAMIN D3) 125 MCG (5000 UT) TABS    Take by mouth    HYDROCHLOROTHIAZIDE (HYDRODIURIL) 25 MG TABLET        LOSARTAN (COZAAR) 50 MG TABLET    TAKE 1 TABLET BY MOUTH EVERY DAY    POTASSIUM CHLORIDE (MICRO-K) 10 MEQ EXTENDED RELEASE CAPSULE             ALLERGIES     Patient has no known allergies. FAMILYHISTORY     History reviewed. No pertinent family history. SOCIAL HISTORY       Social History     Socioeconomic History    Marital status:      Spouse name: None    Number of children: None    Years of education: None    Highest education level: None   Occupational History    None   Tobacco Use    Smoking status: Never Smoker    Smokeless tobacco: Never Used   Substance and Sexual Activity    Alcohol use: Never    Drug use: Never    Sexual activity: None   Other Topics Concern    None   Social History Narrative    None     Social Determinants of Health     Financial Resource Strain:     Difficulty of Paying Living Expenses: Not on file   Food Insecurity:     Worried About Running Out of Food in the Last Year: Not on file    Bart of Food in the Last Year: Not on file   Transportation Needs:     Lack of Transportation (Medical): Not on file    Lack of Transportation (Non-Medical):  Not on file   Physical Activity:     Days of Exercise per Week: Not on file    Minutes of Exercise per Session: Not on file   Stress:     Feeling of Stress : Not on file   Social Connections:     Frequency of Communication with Friends and Family: Not on file    Frequency of Social Gatherings with Friends and Family: Not on file    Attends Buddhism Services: Not on file    Active Member of Clubs or Organizations: Not on file    Attends Club or Organization Meetings: Not on file    Marital Status: Not on file   Intimate Partner Violence:     Fear of Current or Ex-Partner: Not on file    Emotionally Abused: Not on file    Physically Abused: Not on file    Sexually Abused: Not on file   Housing Stability:     Unable to Pay for Housing in the Last Year: Not on file    Number of [place] in the Last Year: Not on file    Unstable Housing in the Last Year: Not on file       SCREENINGS    [place] Coma Scale  Eye Opening: Spontaneous  Best Verbal Response: Oriented  Best Motor Response: Obeys commands  [place] Coma Scale Score: 15        PHYSICAL EXAM    (up to 7 for level 4, 8 or more for level 5)     ED Triage Vitals   BP Temp Temp src Pulse Resp SpO2 Height Weight   -- -- -- -- -- -- -- --       Physical Exam     General Appearance:  Alert, cooperative, no distress, appears stated age. Head:  Normocephalic, without obvious abnormality, atraumatic. Eyes:  conjunctiva/corneas clear, EOM's intact. Sclera anicteric. ENT: Mucous membranes moist.   Neck: Supple, symmetrical, trachea midline, no adenopathy. No jugular venous distention. Lungs:   No Respiratory Distress. Chest Wall:  Atrauamtic   Heart:  RRR   Abdomen:   Soft, NT, ND   Extremities:  Full range of motion. Pulses: Symmetric x4   Skin:  No rashes or lesions to exposed skin. Neurologic: Alert and oriented X 3. Motor grossly normal.  Speech clear. DIAGNOSTIC RESULTS   LABS:    Labs Reviewed   COMPREHENSIVE METABOLIC PANEL W/ REFLEX TO MG FOR LOW K   CBC WITH AUTO DIFFERENTIAL   PROTIME-INR   APTT       All other labs were within normal range or not returned as of this dictation. EKG: All EKG's are interpreted by the Emergency Department Physician in the absence of a cardiologist.  Please see their note for interpretation of EKG.       RADIOLOGY:   Non-plain film images such as CT, Ultrasound and MRI are read by the radiologist. Plain radiographic images are visualized andpreliminarily interpreted by the  ED Provider with the below findings:        Interpretation pert Radiologist below, if available at the time of this note:    No orders to display     VL Extremity Venous Right    Result Date: 2/18/2022  Vascular Lower Extremities DVT Study Procedure -- PRELIMINARY SONOGRAPHER REPORT --   Demographics   Patient Name       Devorah Garcia   Date of Study      02/18/2022         Gender              Female   Patient Number     [id number]         Date of Birth       1970   Visit Number       [id number]          Age                 45 year(s)   Accession Number   [id number]         Room Number   Corporate ID       [id number]            Sonographer         Doris Ash,                                                            JORDANT, RT   Ordering Physician MD Melissa Boston, DO           Physician  Procedure Type of Study:   Veins:Lower Extremities DVT Study, VL EXTREMITY VENOUS DUPLEX RIGHT. Tech Comments Right Acute partially occluding deep vein thrombosis involving the right popliteal vein. Acute totally occluding deep vein thrombosis involving the right peroneal vein and one of the pair of posterior tibial veins. No other evidence of deep vein or superficial vein thrombosis involving the right lower extremity and the right common femoral vein. The previously reported right mid and distal femoral thrombus is no longer seen and appears to have resolved since November of 2021. Called Dr. Rashard Amin with positive results. She stated to send the patient to the Emergency room. Called the ER to let them know I was sending a patient with positive DVT to them per ordering MD instructions. Sent preliminary with patient.  Left          PROCEDURES   Unless otherwise noted below, none     Procedures    CRITICAL CARE TIME   N/A    CONSULTS:  None      EMERGENCY DEPARTMENT COURSE and DIFFERENTIAL DIAGNOSIS/MDM:   Vitals:    Vitals:    02/18/22 1655   BP: 137/89   Pulse: 84   Resp: 18   Temp: 98 °F (36.7 °C)   TempSrc: Oral   SpO2: 99% Weight: 181 lb (82.1 kg)       Patient was given thefollowing medications:  Medications   enoxaparin (LOVENOX) injection 80 mg (has no administration in time range)       51yoF with a new DVT to her right lower extremity with resolution of prior DVT from back in Nov 2021. Already on Eliquis 5 mg p.o. twice daily. Want to be able to go home. Given the above (Technically failure of anticoagulation) I will discuss this with oncology; Spoke with oncology/[facility] ([facility]) @ 1710. Will switch to Lovenox 1mg/kg BID, have f/u with [facility]. FINAL IMPRESSION      1. Acute deep vein thrombosis (DVT) of right peroneal vein (HCC)          DISPOSITION/PLAN   DISPOSITION        PATIENT REFERREDTO:  No follow-up provider specified.     DISCHARGE MEDICATIONS:  New Prescriptions    ENOXAPARIN (LOVENOX) 30 MG/0.3ML INJECTION    Inject 0.8 mLs into the skin 2 times daily       DISCONTINUED MEDICATIONS:  Discontinued Medications    No medications on file              (Please note that portions ofthis note were completed with a voice recognition program.  Efforts were made to edit the dictations but occasionally words are mis-transcribed.)    Imani Nelson MD (electronically signed)           Imani Nelson MD  02/18/22 5926

## 2022-02-18 NOTE — ED NOTES
Educated pt on lovenox medication and administration.   Pt demonstrated learning with injection     Isak Harris RN  02/18/22 0399

## 2022-03-30 ENCOUNTER — OFFICE VISIT (OUTPATIENT)
Dept: VASCULAR SURGERY | Age: 52
End: 2022-03-30
Payer: COMMERCIAL

## 2022-03-30 VITALS
SYSTOLIC BLOOD PRESSURE: 105 MMHG | BODY MASS INDEX: 33.75 KG/M2 | HEIGHT: 62 IN | WEIGHT: 183.4 LBS | DIASTOLIC BLOOD PRESSURE: 70 MMHG

## 2022-03-30 DIAGNOSIS — I82.411 ACUTE DEEP VEIN THROMBOSIS (DVT) OF FEMORAL VEIN OF RIGHT LOWER EXTREMITY (HCC): Primary | ICD-10-CM

## 2022-03-30 PROCEDURE — 3017F COLORECTAL CA SCREEN DOC REV: CPT | Performed by: SURGERY

## 2022-03-30 PROCEDURE — G8484 FLU IMMUNIZE NO ADMIN: HCPCS | Performed by: SURGERY

## 2022-03-30 PROCEDURE — G8417 CALC BMI ABV UP PARAM F/U: HCPCS | Performed by: SURGERY

## 2022-03-30 PROCEDURE — 99204 OFFICE O/P NEW MOD 45 MIN: CPT | Performed by: SURGERY

## 2022-03-30 PROCEDURE — G8427 DOCREV CUR MEDS BY ELIG CLIN: HCPCS | Performed by: SURGERY

## 2022-03-30 PROCEDURE — 1036F TOBACCO NON-USER: CPT | Performed by: SURGERY

## 2022-03-30 RX ORDER — FONDAPARINUX SODIUM 7.5 MG/.6ML
INJECTION SUBCUTANEOUS
COMMUNITY
Start: 2022-03-11

## 2022-03-30 RX ORDER — CLONIDINE HYDROCHLORIDE 0.2 MG/1
TABLET ORAL
COMMUNITY
Start: 2022-03-17

## 2022-03-30 ASSESSMENT — ENCOUNTER SYMPTOMS
EYES NEGATIVE: 1
ALLERGIC/IMMUNOLOGIC NEGATIVE: 1
RESPIRATORY NEGATIVE: 1

## 2022-03-30 NOTE — PROGRESS NOTES
parinuxSubjective:      Patient ID: Jamie Elizondo is a 45 y.o. female. HPI Referral from [address] [place] [address] for evaluation of a recent right leg DVT initially documented 11/18/2021 following right foot surgery and involved the right superficial femoral vein extending through the popliteal into the tibial vessels. She was begun on Eliquis at that time and complained of some right leg heaviness in February at which time she underwent another duplex scan which was interpreted as acute partially occluding popliteal and tibial clot with resolution of the SFV clot. Managing physicians at that time felt as though she was a failure of anticoagulation and she was placed on Lovenox and has been converted to once a day dosing of Fondaparinux which she has been told will be continued indefinitely for the rest of her life. No previous history of DVTs. No family history of DVT. No apparent hypercoagulable work-up has been performed. She currently is wearing stockings and her leg feels better. Past Medical History:   Diagnosis Date    Disease of blood and blood forming organ     DVT of popliteal vein (HCC)     History of dilatation and curettage 02/17/2020     Past Surgical History:   Procedure Laterality Date    MOUTH SURGERY       No Known Allergies  Current Outpatient Medications   Medication Sig Dispense Refill    cloNIDine (CATAPRES) 0.2 MG tablet TAKE 1 TABLET BY MOUTH TWICE A DAY      fondaparinux (ARIXTRA) 7.5 MG/0.6ML SOLN injection 7.5 MG SUBCUTANEOUSLY DAILY.  1 DAILY      Cholecalciferol (VITAMIN D3) 125 MCG (5000 UT) TABS Take by mouth      amLODIPine (NORVASC) 5 MG tablet Take 5 mg by mouth daily      potassium chloride (MICRO-K) 10 MEQ extended release capsule       losartan (COZAAR) 50 MG tablet TAKE 1 TABLET BY MOUTH EVERY DAY      hydroCHLOROthiazide (HYDRODIURIL) 25 MG tablet       apixaban (ELIQUIS) 5 MG TABS tablet Take 2 tablets by mouth 2 times daily for 7 days, THEN 1 tablet 2 times daily. (Patient not taking: Reported on 3/30/2022) 180 tablet 1     No current facility-administered medications for this visit. Social History     Socioeconomic History    Marital status:      Spouse name: Not on file    Number of children: Not on file    Years of education: Not on file    Highest education level: Not on file   Occupational History    Not on file   Tobacco Use    Smoking status: Former Smoker     Packs/day: 1.00     Years: 10.00     Pack years: 10.00     Types: Cigarettes    Smokeless tobacco: Never Used    Tobacco comment: [id number]   Vaping Use    Vaping Use: Never used   Substance and Sexual Activity    Alcohol use: Never    Drug use: Never    Sexual activity: Not on file   Other Topics Concern    Not on file   Social History Narrative    Not on file     Social Determinants of Health     Financial Resource Strain:     Difficulty of Paying Living Expenses: Not on file   Food Insecurity:     Worried About [address] in the Last Year: Not on file    [address] in the Last Year: Not on file   Transportation Needs:     Lack of Transportation (Medical): Not on file    Lack of Transportation (Non-Medical):  Not on file   Physical Activity:     Days of Exercise per Week: Not on file    Minutes of Exercise per Session: Not on file   Stress:     Feeling of Stress : Not on file   Social Connections:     Frequency of Communication with Friends and Family: Not on file    Frequency of Social Gatherings with Friends and Family: Not on file    Attends Jain Services: Not on file    Active Member of Clubs or Organizations: Not on file    Attends Club or Organization Meetings: Not on file    Marital Status: Not on file   Intimate Partner Violence:     Fear of Current or Ex-Partner: Not on file    Emotionally Abused: Not on file    Physically Abused: Not on file    Sexually Abused: Not on file   Housing Stability:     Unable to Pay for Housing in the Last Year: Not on file    Number of Places Lived in the Last Year: Not on file    Unstable Housing in the Last Year: Not on file     History reviewed. No pertinent family history. Review of Systems   Constitutional: Negative. HENT: Negative. Eyes: Negative. Respiratory: Negative. Cardiovascular: Negative. Endocrine: Negative. Genitourinary: Negative. Musculoskeletal: Negative. Skin: Negative. Allergic/Immunologic: Negative. Neurological: Negative. Hematological: Negative. Psychiatric/Behavioral: Negative. 15 pt ROS confirmed by this MD personally. Objective:   Physical Exam  Vitals and nursing note reviewed. Constitutional:       Appearance: Normal appearance. She is obese. HENT:      Head: Normocephalic and atraumatic. Right Ear: External ear normal.      Left Ear: External ear normal.      Nose: Nose normal.      Mouth/Throat:      Mouth: Mucous membranes are moist.      Pharynx: Oropharynx is clear. Eyes:      Extraocular Movements: Extraocular movements intact. Conjunctiva/sclera: Conjunctivae normal.   Cardiovascular:      Rate and Rhythm: Normal rate and regular rhythm. Heart sounds: Normal heart sounds. Pulmonary:      Effort: Pulmonary effort is normal.      Breath sounds: Normal breath sounds. Abdominal:      General: Bowel sounds are normal.      Palpations: Abdomen is soft. There is no mass. Hernia: No hernia is present. Musculoskeletal:      Cervical back: Normal range of motion. Right lower leg: No edema. Left lower leg: No edema. Skin:     General: Skin is warm and dry. Capillary Refill: Capillary refill takes less than 2 seconds. Findings: No lesion. Comments: Small VVs bilaterally with reticular and spider veins. No cords or erythema. Neurological:      General: No focal deficit present. Mental Status: She is alert and oriented to person, place, and time. Cranial Nerves:  No cranial nerve deficit. Sensory: No sensory deficit. Motor: No weakness. Coordination: Coordination normal.      Gait: Gait normal.   Psychiatric:         Mood and Affect: Mood normal.         Behavior: Behavior normal.         Thought Content: Thought content normal.         Judgment: Judgment normal.     Pulses:   R bruit  L bruit   2   carotid 2    2   brachial 2    2   radial 2    2   femoral 2    2   popliteal 2    2   posterior tibial 2    2   dorsalis pedis 2    na   bypass graft na      VDS 11/18/2021  Impressions   Right Impression   Acute partially occluding deep vein thrombosis involving the right mid to   distal FV. Acute totally occluding deep vein thrombosis involving the right popliteal   vein, PTV's throughout, peroneal veins throughout, and set of gastroc veins   zone 5-6. Acute totally occluding superficial venous thrombosis involving the right LSV   zone 7. No other evidence of deep vein or superficial vein thrombosis involving the   right lower extremity and the left common femoral vein. VDS 2/18/2022  Impressions   Right Impression   Acute partially occluding deep vein thrombosis involving the right popliteal   vein. Acute totally occluding deep vein thrombosis involving the right peroneal vein   and one of the pair of posterior tibial veins. No other evidence of deep vein or superficial vein thrombosis involving the   right lower extremity and the right common femoral vein.       The previously reported right mid and distal femoral thrombus is no longer   seen and appears to have resolved since November of 2021.         [place] venous scans personally reviewed for comparison purposes. 2/2022 scan shows echogenic changes with relatively normal size popliteal vein. Assessment:      DVT R leg - \"recurrence\" of 2/2022 likely was not a true recurrence by my review but may well have represented residual resolving DVT from the initial event in November 2021.   This is based upon appearance and location of the changes on both scans      Plan:      I have suggested the patient she continue wearing her knee-high compression stockings. I will communicate my impression to both Dr. Marlin Jean-Baptiste and Dr. Candelaria Knowles but allow them to manage her anticoagulation as they see fit at this time. If discontinuing her blood thinners after 6 months is a consideration it would be best then to repeat a hypercoagulable profile after being off the blood thinners for 4 to 8 weeks. I will communicate with the other physicians and the patient will follow up with me in future as needed.

## 2022-03-30 NOTE — LETTER
[facility]) - Vascular and Endovascular Surgeons  [address] [address]  Phone: [phone]  Fax: [phone]    Sukhjinder Galeano MD    March 30, 2022     [address] [facility] [place], [address] [facility] [address]    Patient: Pineda Ivan   MR Number: [id number]   YOB: 1970   Date of Visit: 3/30/2022       Dear [address] [facility] [place]: Thank you for referring Jean Pierre Francis to me for evaluation/treatment. Below are the relevant portions of my assessment and plan of care. DVT R leg - \"recurrence\" of 2/2022 likely was not a true recurrence by my review but may well have represented residual resolving DVT from the initial event in November 2021. This is based upon appearance and location of the changes on both scans    I have suggested the patient she continue wearing her knee-high compression stockings. I will communicate my impression to both Dr. Jos Neal and Dr. Olinda Lepe but allow them to manage her anticoagulation as they see fit at this time. If discontinuing her blood thinners after 6 months is a consideration it would be best then to repeat a hypercoagulable profile after being off the blood thinners for 4 to 8 weeks. I will communicate with the other physicians and the patient will follow up with me in future as needed. If you have questions, please do not hesitate to call me. I look forward to following Ksenia Boyle along with you.     Sincerely,      Sukhjinder Galeano MD

## 2022-04-01 ENCOUNTER — HOSPITAL ENCOUNTER (OUTPATIENT)
Age: 52
Discharge: HOME | End: 2022-04-01

## 2022-04-02 ENCOUNTER — HOSPITAL ENCOUNTER (OUTPATIENT)
Age: 52
Discharge: HOME | End: 2022-04-02
Payer: COMMERCIAL

## 2022-04-02 LAB
A/G RATIO: 1.8 (ref 1.1–2.2)
ALBUMIN SERPL-MCNC: 4.6 G/DL (ref 3.4–5)
ALP BLD-CCNC: 101 U/L (ref 40–129)
ALT SERPL-CCNC: 29 U/L (ref 10–40)
ANION GAP SERPL CALCULATED.3IONS-SCNC: 11 MMOL/L (ref 3–16)
AST SERPL-CCNC: 25 U/L (ref 15–37)
BILIRUB SERPL-MCNC: 0.4 MG/DL (ref 0–1)
BUN BLDV-MCNC: 17 MG/DL (ref 7–20)
CALCIUM SERPL-MCNC: 9.7 MG/DL (ref 8.3–10.6)
CHLORIDE BLD-SCNC: 103 MMOL/L (ref 99–110)
CHOLESTEROL, TOTAL: 162 MG/DL (ref 0–199)
CO2: 27 MMOL/L (ref 21–32)
CORTISOL - AM: 6.6 UG/DL (ref 4.3–22.4)
CREAT SERPL-MCNC: 0.5 MG/DL (ref 0.6–1.1)
FIBRINOGEN: 301 MG/DL (ref 200–397)
GFR AFRICAN AMERICAN: >60
GFR NON-AFRICAN AMERICAN: >60
GLUCOSE BLD-MCNC: 119 MG/DL (ref 70–99)
HDLC SERPL-MCNC: 90 MG/DL (ref 40–60)
LDL CHOLESTEROL CALCULATED: 61 MG/DL
POTASSIUM SERPL-SCNC: 5.3 MMOL/L (ref 3.5–5.1)
RHEUMATOID FACTOR: 11 IU/ML
SEDIMENTATION RATE, ERYTHROCYTE: 14 MM/HR (ref 0–30)
SODIUM BLD-SCNC: 141 MMOL/L (ref 136–145)
TOTAL PROTEIN: 7.2 G/DL (ref 6.4–8.2)
TRIGL SERPL-MCNC: 54 MG/DL (ref 0–150)
TSH SERPL DL<=0.05 MIU/L-ACNC: 0.7 UIU/ML (ref 0.27–4.2)
VITAMIN D 25-HYDROXY: 56.6 NG/ML
VLDLC SERPL CALC-MCNC: 11 MG/DL

## 2022-04-02 PROCEDURE — 85246 CLOT FACTOR VIII VW ANTIGEN: CPT

## 2022-04-02 PROCEDURE — 84443 ASSAY THYROID STIM HORMONE: CPT

## 2022-04-02 PROCEDURE — 82533 TOTAL CORTISOL: CPT

## 2022-04-02 PROCEDURE — 80061 LIPID PANEL: CPT

## 2022-04-02 PROCEDURE — 85384 FIBRINOGEN ACTIVITY: CPT

## 2022-04-02 PROCEDURE — 80053 COMPREHEN METABOLIC PANEL: CPT

## 2022-04-02 PROCEDURE — 86431 RHEUMATOID FACTOR QUANT: CPT

## 2022-04-02 PROCEDURE — 36415 COLL VENOUS BLD VENIPUNCTURE: CPT

## 2022-04-02 PROCEDURE — 81240 F2 GENE: CPT

## 2022-04-02 PROCEDURE — 85250 CLOT FACTOR IX PTC/CHRSTMAS: CPT

## 2022-04-02 PROCEDURE — 85652 RBC SED RATE AUTOMATED: CPT

## 2022-04-02 PROCEDURE — 81241 F5 GENE: CPT

## 2022-04-02 PROCEDURE — 85230 CLOT FACTOR VII PROCONVERTIN: CPT

## 2022-04-02 PROCEDURE — 82306 VITAMIN D 25 HYDROXY: CPT

## 2022-04-02 PROCEDURE — 86200 CCP ANTIBODY: CPT

## 2022-04-02 PROCEDURE — 86038 ANTINUCLEAR ANTIBODIES: CPT

## 2022-04-03 LAB
ANTI-NUCLEAR ANTIBODY (ANA): NEGATIVE
CYCLIC CITRULLINATED PEPTIDE ANTIBODY IGG: <0.5 U/ML (ref 0–2.9)

## 2022-04-05 ENCOUNTER — HOSPITAL ENCOUNTER (OUTPATIENT)
Dept: MRI IMAGING | Age: 52
Discharge: HOME | End: 2022-04-05
Payer: COMMERCIAL

## 2022-04-05 DIAGNOSIS — D35.00 BENIGN NEOPLASM OF ADRENAL GLAND, UNSPECIFIED LATERALITY: ICD-10-CM

## 2022-04-05 PROCEDURE — 74183 MRI ABD W/O CNTR FLWD CNTR: CPT

## 2022-04-05 PROCEDURE — 6360000004 HC RX CONTRAST MEDICATION: Performed by: FAMILY MEDICINE

## 2022-04-05 PROCEDURE — A9577 INJ MULTIHANCE: HCPCS | Performed by: FAMILY MEDICINE

## 2022-04-05 PROCEDURE — 2580000003 HC RX 258: Performed by: FAMILY MEDICINE

## 2022-04-05 RX ORDER — SODIUM CHLORIDE 9 MG/ML
INJECTION, SOLUTION INTRAVENOUS ONCE
Status: COMPLETED | OUTPATIENT
Start: 2022-04-05 | End: 2022-04-05

## 2022-04-05 RX ADMIN — SODIUM CHLORIDE: 9 INJECTION, SOLUTION INTRAVENOUS at 17:27

## 2022-04-05 RX ADMIN — GADOBENATE DIMEGLUMINE 17 ML: 529 INJECTION, SOLUTION INTRAVENOUS at 17:26

## 2022-04-06 LAB
FACTOR IX ACTIVITY: 114 % (ref 78–184)
FACTOR V LEIDEN: ABNORMAL
FACTOR VII ACTIVITY: 136 % (ref 80–181)
MISCELLANEOUS LAB TEST ORDER: NORMAL
SPECIMEN: ABNORMAL
VON WILLEBRAND AG: 160 % (ref 52–214)

## 2022-04-07 LAB
PROTHROMBIN G20210A MUTATION: NEGATIVE
PT PCR SPECIMEN: NORMAL

## 2022-07-11 ENCOUNTER — HOSPITAL ENCOUNTER (OUTPATIENT)
Dept: CT IMAGING | Age: 52
Discharge: HOME | End: 2022-07-11
Payer: COMMERCIAL

## 2022-07-11 DIAGNOSIS — R91.1 SOLITARY PULMONARY NODULE: ICD-10-CM

## 2022-07-11 PROCEDURE — 71250 CT THORAX DX C-: CPT

## 2023-05-04 ENCOUNTER — HOSPITAL ENCOUNTER (EMERGENCY)
Age: 53
Discharge: HOME | End: 2023-05-04
Payer: COMMERCIAL

## 2023-05-04 VITALS
HEIGHT: 62 IN | OXYGEN SATURATION: 99 % | HEART RATE: 79 BPM | TEMPERATURE: 97.8 F | SYSTOLIC BLOOD PRESSURE: 149 MMHG | RESPIRATION RATE: 20 BRPM | DIASTOLIC BLOOD PRESSURE: 89 MMHG | BODY MASS INDEX: 33.93 KG/M2 | WEIGHT: 184.4 LBS

## 2023-05-04 DIAGNOSIS — M79.604 RIGHT LEG PAIN: Primary | ICD-10-CM

## 2023-05-04 LAB
ALBUMIN SERPL-MCNC: 4.8 G/DL (ref 3.4–5)
ALBUMIN/GLOB SERPL: 1.7 {RATIO} (ref 1.1–2.2)
ALP SERPL-CCNC: 91 U/L (ref 40–129)
ALT SERPL-CCNC: 22 U/L (ref 10–40)
ANION GAP SERPL CALCULATED.3IONS-SCNC: 12 MMOL/L (ref 3–16)
APTT BLD: 26.4 SEC (ref 22.7–35.9)
AST SERPL-CCNC: 23 U/L (ref 15–37)
BASOPHILS # BLD: 0.1 K/UL (ref 0–0.2)
BASOPHILS NFR BLD: 0.7 %
BILIRUB SERPL-MCNC: 0.4 MG/DL (ref 0–1)
BUN SERPL-MCNC: 25 MG/DL (ref 7–20)
CALCIUM SERPL-MCNC: 10.2 MG/DL (ref 8.3–10.6)
CHLORIDE SERPL-SCNC: 104 MMOL/L (ref 99–110)
CO2 SERPL-SCNC: 27 MMOL/L (ref 21–32)
CREAT SERPL-MCNC: 0.7 MG/DL (ref 0.6–1.1)
DEPRECATED RDW RBC AUTO: 14.2 % (ref 12.4–15.4)
EOSINOPHIL # BLD: 0.1 K/UL (ref 0–0.6)
EOSINOPHIL NFR BLD: 1.1 %
GFR SERPLBLD CREATININE-BSD FMLA CKD-EPI: >60 ML/MIN/{1.73_M2}
GLUCOSE SERPL-MCNC: 133 MG/DL (ref 70–99)
HCT VFR BLD AUTO: 43.3 % (ref 36–48)
HGB BLD-MCNC: 14.1 G/DL (ref 12–16)
INR PPP: 0.86 (ref 0.84–1.16)
LYMPHOCYTES # BLD: 3.7 K/UL (ref 1–5.1)
LYMPHOCYTES NFR BLD: 47.4 %
MCH RBC QN AUTO: 27.5 PG (ref 26–34)
MCHC RBC AUTO-ENTMCNC: 32.5 G/DL (ref 31–36)
MCV RBC AUTO: 84.5 FL (ref 80–100)
MONOCYTES # BLD: 0.5 K/UL (ref 0–1.3)
MONOCYTES NFR BLD: 6.8 %
NEUTROPHILS # BLD: 3.5 K/UL (ref 1.7–7.7)
NEUTROPHILS NFR BLD: 44 %
PLATELET # BLD AUTO: 212 K/UL (ref 135–450)
PMV BLD AUTO: 8.3 FL (ref 5–10.5)
POTASSIUM SERPL-SCNC: 4.2 MMOL/L (ref 3.5–5.1)
PROT SERPL-MCNC: 7.7 G/DL (ref 6.4–8.2)
PROTHROMBIN TIME: 11.8 SEC (ref 11.5–14.8)
RBC # BLD AUTO: 5.12 M/UL (ref 4–5.2)
SODIUM SERPL-SCNC: 143 MMOL/L (ref 136–145)
WBC # BLD AUTO: 7.9 K/UL (ref 4–11)

## 2023-05-04 PROCEDURE — 85730 THROMBOPLASTIN TIME PARTIAL: CPT

## 2023-05-04 PROCEDURE — 85025 COMPLETE CBC W/AUTO DIFF WBC: CPT

## 2023-05-04 PROCEDURE — 93971 EXTREMITY STUDY: CPT

## 2023-05-04 PROCEDURE — 80053 COMPREHEN METABOLIC PANEL: CPT

## 2023-05-04 PROCEDURE — 85610 PROTHROMBIN TIME: CPT

## 2023-05-04 PROCEDURE — 99284 EMERGENCY DEPT VISIT MOD MDM: CPT

## 2023-05-04 ASSESSMENT — ENCOUNTER SYMPTOMS
ABDOMINAL PAIN: 0
RESPIRATORY NEGATIVE: 1
CHEST TIGHTNESS: 0
SHORTNESS OF BREATH: 0
DIARRHEA: 0
BACK PAIN: 0
VOMITING: 0
NAUSEA: 0
COUGH: 0
CONSTIPATION: 0
COLOR CHANGE: 0

## 2023-05-04 ASSESSMENT — PAIN SCALES - GENERAL: PAINLEVEL_OUTOF10: 0

## 2023-05-04 ASSESSMENT — PAIN - FUNCTIONAL ASSESSMENT: PAIN_FUNCTIONAL_ASSESSMENT: 0-10

## 2023-05-04 NOTE — ED TRIAGE NOTES
Patient states takes amlodipine and \"another bp medication that starts with an M\". Patient states she also take \"a water pill\" and diazepam but is unsure of dosages.  Patient states \"I didn't come prepared, this all happened so fast.\"

## 2023-05-04 NOTE — ED PROVIDER NOTES
[facility] [facility]        Pt Name: Kezia Calloway  MRN: [id number]  [place] 1970  Date of evaluation: 5/4/2023  Provider: RONNIE Hay  PCP: Klarissa Murguia DO  Note Started: 1:00 PM EDT 5/4/23      ERIN. I have evaluated this patient. My supervising physician was available for consultation. CHIEF COMPLAINT       Chief Complaint   Patient presents with    Leg Pain     Patient states was taking shots for blood clots in R leg that formed 2 years ago (unable to remember name). Patient states doctor wanted to trail her off blood clot and has been off for a year. States feels like R leg is getting tight and is having a hard time putting pressure on leg. HISTORY OF PRESENT ILLNESS: 1 or more Elements     History From: Patient  Limitations to history : None    Kezia Calloway is a 47 y.o. female with past medical history of previous DVT who presents ED with complaint of right calf/leg tightness. Patient reports 2 years ago she was diagnosed with a DVT to her right lower extremity. Patient states she was initially placed on oral medication but had a recurrence of DVT to her right lower extremity despite the oral medication and was transitioned over to injections. Upon review of records it appears that she was on Lovenox and then switched over to Arixtra for blood thinner. She reports she has been following up with a hematologist, Dr. Magnolia Dupree, and they made decision to transition her off of a blood thinner about a year ago. Patient states she was doing well until yesterday when she started feeling like she had some tightness to her right lateral posterior calf. Patient states it is not swollen. She is concerned she is a recurrent DVT and was told to come to the ED for further evaluation and treatment. Denies chest pain or shortness of breath. Denies any injury or trauma.   Denies decreased range of motion or

## 2023-05-04 NOTE — ED NOTES
Discharge and education instructions reviewed. Patient verbalized understanding, teach-back successful. Patient denied questions at this time. No acute distress noted. Patient instructed to follow-up as noted - return to emergency department if symptoms worsen. Patient verbalized understanding. Discharged per EDMD with discharged instructions.        Lawanda Nath RN  05/04/23 9105

## 2023-06-07 ENCOUNTER — OFFICE VISIT (OUTPATIENT)
Dept: VASCULAR SURGERY | Age: 53
End: 2023-06-07
Payer: COMMERCIAL

## 2023-06-07 VITALS — HEIGHT: 62 IN | BODY MASS INDEX: 33.68 KG/M2 | WEIGHT: 183 LBS

## 2023-06-07 DIAGNOSIS — Z86.718 HISTORY OF DVT (DEEP VEIN THROMBOSIS): Primary | ICD-10-CM

## 2023-06-07 DIAGNOSIS — I87.009 POST-PHLEBITIC SYNDROME: ICD-10-CM

## 2023-06-07 PROCEDURE — G8419 CALC BMI OUT NRM PARAM NOF/U: HCPCS | Performed by: SURGERY

## 2023-06-07 PROCEDURE — 99213 OFFICE O/P EST LOW 20 MIN: CPT | Performed by: SURGERY

## 2023-06-07 PROCEDURE — G8427 DOCREV CUR MEDS BY ELIG CLIN: HCPCS | Performed by: SURGERY

## 2023-06-07 PROCEDURE — 3017F COLORECTAL CA SCREEN DOC REV: CPT | Performed by: SURGERY

## 2023-06-07 PROCEDURE — 1036F TOBACCO NON-USER: CPT | Performed by: SURGERY

## 2023-06-07 ASSESSMENT — ENCOUNTER SYMPTOMS
ALLERGIC/IMMUNOLOGIC NEGATIVE: 1
RESPIRATORY NEGATIVE: 1
EYES NEGATIVE: 1

## 2023-06-07 NOTE — PROGRESS NOTES
parinuxSubjective:      Patient ID: Juanitasydney Townsend is a 47 y.o. female. HPI Previous referral from [address] [place] [address] for evaluation of a recent right leg DVT initially documented 11/18/2021 following right foot surgery and involved the right superficial femoral vein extending through the popliteal into the tibial vessels. Off AC but recent ER visit for right leg discomfort and tightness. Duplex scan that day was negative for DVT but showed chronic changes in the right popliteal vein consistent with previous DVT. Symptoms seem to have developed after the patient began a job that required significant dependency. She has not worn her stockings as consistently as recommended. Past Medical History:   Diagnosis Date    Disease of blood and blood forming organ     DVT of popliteal vein (HCC)     History of dilatation and curettage 02/17/2020     Past Surgical History:   Procedure Laterality Date    MOUTH SURGERY       Allergies   Allergen Reactions    Bee Venom      Current Outpatient Medications   Medication Sig Dispense Refill    cloNIDine (CATAPRES) 0.2 MG tablet TAKE 1 TABLET BY MOUTH TWICE A DAY      fondaparinux (ARIXTRA) 7.5 MG/0.6ML SOLN injection 7.5 MG SUBCUTANEOUSLY DAILY. 1 DAILY      Cholecalciferol (VITAMIN D3) 125 MCG (5000 UT) TABS Take by mouth      amLODIPine (NORVASC) 5 MG tablet Take 5 mg by mouth daily      potassium chloride (MICRO-K) 10 MEQ extended release capsule       losartan (COZAAR) 50 MG tablet TAKE 1 TABLET BY MOUTH EVERY DAY      hydroCHLOROthiazide (HYDRODIURIL) 25 MG tablet        No current facility-administered medications for this visit.      Social History     Socioeconomic History    Marital status:      Spouse name: Not on file    Number of children: Not on file    Years of education: Not on file    Highest education level: Not on file   Occupational History    Not on file   Tobacco Use    Smoking status: Former     Packs/day: 1.00     Years: 10.00     Pack

## 2023-07-26 ENCOUNTER — APPOINTMENT (OUTPATIENT)
Dept: GENERAL RADIOLOGY | Age: 53
End: 2023-07-26
Payer: COMMERCIAL

## 2023-07-26 ENCOUNTER — HOSPITAL ENCOUNTER (EMERGENCY)
Age: 53
Discharge: HOME | End: 2023-07-26
Payer: COMMERCIAL

## 2023-07-26 VITALS
SYSTOLIC BLOOD PRESSURE: 144 MMHG | WEIGHT: 186 LBS | OXYGEN SATURATION: 99 % | DIASTOLIC BLOOD PRESSURE: 81 MMHG | HEART RATE: 106 BPM | TEMPERATURE: 98.3 F | BODY MASS INDEX: 34.23 KG/M2 | RESPIRATION RATE: 22 BRPM | HEIGHT: 62 IN

## 2023-07-26 DIAGNOSIS — S81.851A CAT BITE OF RIGHT LOWER LEG, INITIAL ENCOUNTER: Primary | ICD-10-CM
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
## 2023-07-26 PROCEDURE — 12031 INTMD RPR S/A/T/EXT 2.5 CM/<: CPT

## 2023-07-26 PROCEDURE — 6360000002 HC RX W HCPCS: Performed by: PHYSICIAN ASSISTANT

## 2023-07-26 PROCEDURE — 99284 EMERGENCY DEPT VISIT MOD MDM: CPT

## 2023-07-26 PROCEDURE — 90471 IMMUNIZATION ADMIN: CPT | Performed by: PHYSICIAN ASSISTANT

## 2023-07-26 PROCEDURE — 73590 X-RAY EXAM OF LOWER LEG: CPT

## 2023-07-26 PROCEDURE — 90714 TD VACC NO PRESV 7 YRS+ IM: CPT | Performed by: PHYSICIAN ASSISTANT

## 2023-07-26 PROCEDURE — 6370000000 HC RX 637 (ALT 250 FOR IP): Performed by: PHYSICIAN ASSISTANT

## 2023-07-26 RX ORDER — TRAMADOL HYDROCHLORIDE 50 MG/1
50 TABLET ORAL EVERY 6 HOURS PRN
Qty: 12 TABLET | Refills: 0 | Status: SHIPPED | OUTPATIENT
Start: 2023-07-26 | End: 2023-07-29

## 2023-07-26 RX ORDER — AMOXICILLIN AND CLAVULANATE POTASSIUM 875; 125 MG/1; MG/1
1 TABLET, FILM COATED ORAL ONCE
Status: COMPLETED | OUTPATIENT
Start: 2023-07-26 | End: 2023-07-26

## 2023-07-26 RX ORDER — OXYCODONE HYDROCHLORIDE AND ACETAMINOPHEN 5; 325 MG/1; MG/1
1 TABLET ORAL ONCE
Status: COMPLETED | OUTPATIENT
Start: 2023-07-26 | End: 2023-07-26

## 2023-07-26 RX ORDER — AMOXICILLIN AND CLAVULANATE POTASSIUM 875; 125 MG/1; MG/1
1 TABLET, FILM COATED ORAL 2 TIMES DAILY
Qty: 20 TABLET | Refills: 0 | Status: SHIPPED | OUTPATIENT
Start: 2023-07-26 | End: 2023-08-05

## 2023-07-26 RX ADMIN — OXYCODONE AND ACETAMINOPHEN 1 TABLET: 5; 325 TABLET ORAL at 18:30

## 2023-07-26 RX ADMIN — CLOSTRIDIUM TETANI TOXOID ANTIGEN (FORMALDEHYDE INACTIVATED) AND CORYNEBACTERIUM DIPHTHERIAE TOXOID ANTIGEN (FORMALDEHYDE INACTIVATED) 0.5 ML: 5; 2 INJECTION, SUSPENSION INTRAMUSCULAR at 18:31

## 2023-07-26 RX ADMIN — AMOXICILLIN AND CLAVULANATE POTASSIUM 1 TABLET: 875; 125 TABLET, FILM COATED ORAL at 18:30

## 2023-07-26 ASSESSMENT — ENCOUNTER SYMPTOMS
CHEST TIGHTNESS: 0
NAUSEA: 0
SHORTNESS OF BREATH: 0
VOMITING: 0
ABDOMINAL PAIN: 0
DIARRHEA: 0

## 2023-07-26 NOTE — ED NOTES
Wound care complete  Discharge instructions reviewed with patient  Patient verbalized understanding  Patient ambulatory to exit without difficulty     Ian Bell RN  07/26/23 2082

## 2023-11-01 ENCOUNTER — HOSPITAL ENCOUNTER (OUTPATIENT)
Dept: WOUND CARE | Age: 53
Discharge: HOME | End: 2023-11-01
Attending: SURGERY
Payer: COMMERCIAL

## 2023-11-01 VITALS — SYSTOLIC BLOOD PRESSURE: 141 MMHG | DIASTOLIC BLOOD PRESSURE: 85 MMHG | RESPIRATION RATE: 16 BRPM | HEART RATE: 81 BPM

## 2023-11-01 DIAGNOSIS — S81.801A OPEN WOUND OF RIGHT LOWER LEG, INITIAL ENCOUNTER: Primary | ICD-10-CM

## 2023-11-01 PROCEDURE — 29581 APPL MULTLAYER CMPRN SYS LEG: CPT

## 2023-11-01 PROCEDURE — 11042 DBRDMT SUBQ TIS 1ST 20SQCM/<: CPT | Performed by: SURGERY

## 2023-11-01 PROCEDURE — 11042 DBRDMT SUBQ TIS 1ST 20SQCM/<: CPT

## 2023-11-01 PROCEDURE — 99212 OFFICE O/P EST SF 10 MIN: CPT

## 2023-11-01 RX ORDER — BACITRACIN ZINC AND POLYMYXIN B SULFATE 500; 1000 [USP'U]/G; [USP'U]/G
OINTMENT TOPICAL ONCE
OUTPATIENT
Start: 2023-11-01 | End: 2023-11-01

## 2023-11-01 RX ORDER — LIDOCAINE 50 MG/G
OINTMENT TOPICAL ONCE
OUTPATIENT
Start: 2023-11-01 | End: 2023-11-01

## 2023-11-01 RX ORDER — LIDOCAINE HYDROCHLORIDE 20 MG/ML
JELLY TOPICAL ONCE
OUTPATIENT
Start: 2023-11-01 | End: 2023-11-01

## 2023-11-01 RX ORDER — GENTAMICIN SULFATE 1 MG/G
OINTMENT TOPICAL ONCE
OUTPATIENT
Start: 2023-11-01 | End: 2023-11-01

## 2023-11-01 RX ORDER — CLOBETASOL PROPIONATE 0.5 MG/G
OINTMENT TOPICAL ONCE
OUTPATIENT
Start: 2023-11-01 | End: 2023-11-01

## 2023-11-01 RX ORDER — IBUPROFEN 200 MG
TABLET ORAL ONCE
OUTPATIENT
Start: 2023-11-01 | End: 2023-11-01

## 2023-11-01 RX ORDER — LIDOCAINE HYDROCHLORIDE 40 MG/ML
SOLUTION TOPICAL ONCE
OUTPATIENT
Start: 2023-11-01 | End: 2023-11-01

## 2023-11-01 RX ORDER — SODIUM CHLOR/HYPOCHLOROUS ACID 0.033 %
SOLUTION, IRRIGATION IRRIGATION ONCE
OUTPATIENT
Start: 2023-11-01 | End: 2023-11-01

## 2023-11-01 RX ORDER — LIDOCAINE 40 MG/G
CREAM TOPICAL ONCE
Status: DISCONTINUED | OUTPATIENT
Start: 2023-11-01 | End: 2023-11-02 | Stop reason: HOSPADM

## 2023-11-01 RX ORDER — LIDOCAINE 40 MG/G
CREAM TOPICAL ONCE
OUTPATIENT
Start: 2023-11-01 | End: 2023-11-01

## 2023-11-01 RX ORDER — GINSENG 100 MG
CAPSULE ORAL ONCE
OUTPATIENT
Start: 2023-11-01 | End: 2023-11-01

## 2023-11-01 RX ORDER — TRIAMCINOLONE ACETONIDE 1 MG/G
OINTMENT TOPICAL ONCE
OUTPATIENT
Start: 2023-11-01 | End: 2023-11-01

## 2023-11-01 NOTE — PLAN OF CARE
Discharge instructions given. Patient verbalized understanding. Return to [address] [place] [address] in 1 week(s).

## 2023-11-01 NOTE — PROGRESS NOTES
Multilayer Compression Wrap   Below the Knee    NAME:  Anika Estes  YOB: 1970  MEDICAL RECORD NUMBER:  [id number]  DATE:  11/1/2023    Multilayer compression wrap: Applied primary and secondary dressing as ordered. Applied multilayered dressing below the knee to right lower leg. Instructed patient/caregiver not to remove dressing and to keep it clean and dry. Instructed patient/caregiver on complications to report to provider, such as pain, numbness in toes, heavy drainage, and slippage of dressing. Instructed patient on purpose of compression dressing and on activity and exercise recommendations.      Applied  2 layer wrap from toes to knee overlapping each time    Electronically signed by Melecio Oliva RN on 11/1/2023 at 11:43 AM

## 2023-11-01 NOTE — PATIENT INSTRUCTIONS
please anticipate that call. If your out-of-pocket cost could be substantial, Many companies have financial hardship programs for patients who qualify, so please ask about that if you might need a hand. If you have any questions about your supplies or your potential out-of-pocket costs, or if you need to place an order for a refill of supplies (typically monthly), please call the company directly. Your  is Hannah Kuhn    Follow up with Dr Adolph Jiang In 1 week(s) in the wound care center. Wound Care Center Information: Should you experience any significant changes in your wound(s) or have questions about your wound care, please contact the [address] [place] [address] at [phone] Monday  - Thursday 8:00 am - 4:00 pm and Friday 8:00 am - 1:00pm. If you need help with your wound outside these hours and cannot wait until we are again available, contact your PCP or go to the hospital emergency room. PLEASE NOTE: IF YOU ARE UNABLE TO OBTAIN WOUND SUPPLIES, CONTINUE TO USE THE SUPPLIES YOU HAVE AVAILABLE UNTIL YOU ARE ABLE TO REACH US. IT IS MOST IMPORTANT TO KEEP THE WOUND COVERED AT ALL TIMES. Patient Experience    Thank you for trusting us with your care. You may receive a survey from a company called [employer] asking for your feedback. We would appreciate it if you took a few minutes to share your experience. Your input is very valuable to us.

## 2023-11-01 NOTE — PROGRESS NOTES
[address] [facility],Third Floor Progress and Procedure Note    Nasim Villarreal  AGE: 47 y.o. GENDER: female    : 1970  TODAY'S DATE: 2023    Chief Complaint   Patient presents with    Wound Check     Cat scratch to right leg 13 weeks ago        History of Present Illness     Nasim Villarreal is a 47 y.o. female who presents today for wound evaluation. History of Wound: traumatic and infectious wound located on the right leg. Cat bite that was bleeding requiring suturing for control, leading to subsequent infection  Wound Pain:  mild  Severity: 2/10   Wound Type: infectious and traumatic  Modifying Factors:  edema  Associated Signs/Symptoms:  edema    Past Medical History:   Diagnosis Date    Disease of blood and blood forming organ     DVT of popliteal vein (HCC)     History of dilatation and curettage 2020     Past Surgical History:   Procedure Laterality Date    MOUTH SURGERY       Current Outpatient Medications   Medication Sig Dispense Refill    dilTIAZem (CARDIZEM) 30 MG tablet Take 1 tablet by mouth 4 times daily      fondaparinux (ARIXTRA) 7.5 MG/0.6ML SOLN injection 7.5 MG SUBCUTANEOUSLY DAILY. 1 DAILY      Cholecalciferol (VITAMIN D3) 125 MCG (5000 UT) TABS Take by mouth      potassium chloride (MICRO-K) 10 MEQ extended release capsule       hydroCHLOROthiazide (HYDRODIURIL) 25 MG tablet        No current facility-administered medications for this encounter. Social History:   Social History     Tobacco Use    Smoking status: Former     Packs/day: 1.00     Years: 10.00     Additional pack years: 0.00     Total pack years: 10.00     Types: Cigarettes    Smokeless tobacco: Never    Tobacco comments:     [id number]   Vaping Use    Vaping Use: Never used   Substance Use Topics    Alcohol use: Never    Drug use: Never     Allergies:  Bee venom    Procedure:      Indications:  Based on my examination of this patient's wound(s)/ulcer(s) today, debridement is required to promote

## 2023-11-07 NOTE — PATIENT INSTRUCTIONS
please anticipate that call. If your out-of-pocket cost could be substantial, Many companies have financial hardship programs for patients who qualify, so please ask about that if you might need a hand. If you have any questions about your supplies or your potential out-of-pocket costs, or if you need to place an order for a refill of supplies (typically monthly), please call the company directly. Your  is Nate Giang    Follow up with Dr Debby Vickers In 1 week(s) in the wound care center. Wound Care Center Information: Should you experience any significant changes in your wound(s) or have questions about your wound care, please contact the [address] [place] [address] at [phone] Monday  - Thursday 8:00 am - 4:00 pm and Friday 8:00 am - 1:00pm. If you need help with your wound outside these hours and cannot wait until we are again available, contact your PCP or go to the hospital emergency room. PLEASE NOTE: IF YOU ARE UNABLE TO OBTAIN WOUND SUPPLIES, CONTINUE TO USE THE SUPPLIES YOU HAVE AVAILABLE UNTIL YOU ARE ABLE TO REACH US. IT IS MOST IMPORTANT TO KEEP THE WOUND COVERED AT ALL TIMES. Patient Experience    Thank you for trusting us with your care. You may receive a survey from a company called [employer] asking for your feedback. We would appreciate it if you took a few minutes to share your experience. Your input is very valuable to us.

## 2023-11-08 ENCOUNTER — HOSPITAL ENCOUNTER (OUTPATIENT)
Dept: WOUND CARE | Age: 53
Discharge: HOME | End: 2023-11-08
Attending: SURGERY
Payer: COMMERCIAL

## 2023-11-08 VITALS — HEART RATE: 73 BPM | RESPIRATION RATE: 15 BRPM | SYSTOLIC BLOOD PRESSURE: 121 MMHG | DIASTOLIC BLOOD PRESSURE: 77 MMHG

## 2023-11-08 DIAGNOSIS — S81.801A OPEN WOUND OF RIGHT LOWER LEG, INITIAL ENCOUNTER: Primary | ICD-10-CM

## 2023-11-08 PROCEDURE — 11042 DBRDMT SUBQ TIS 1ST 20SQCM/<: CPT | Performed by: SURGERY

## 2023-11-08 PROCEDURE — 29581 APPL MULTLAYER CMPRN SYS LEG: CPT

## 2023-11-08 PROCEDURE — 11042 DBRDMT SUBQ TIS 1ST 20SQCM/<: CPT

## 2023-11-08 RX ORDER — LIDOCAINE HYDROCHLORIDE 20 MG/ML
JELLY TOPICAL ONCE
OUTPATIENT
Start: 2023-11-08 | End: 2023-11-08

## 2023-11-08 RX ORDER — IBUPROFEN 200 MG
TABLET ORAL ONCE
OUTPATIENT
Start: 2023-11-08 | End: 2023-11-08

## 2023-11-08 RX ORDER — SODIUM CHLOR/HYPOCHLOROUS ACID 0.033 %
SOLUTION, IRRIGATION IRRIGATION ONCE
OUTPATIENT
Start: 2023-11-08 | End: 2023-11-08

## 2023-11-08 RX ORDER — LIDOCAINE 40 MG/G
CREAM TOPICAL ONCE
Status: DISCONTINUED | OUTPATIENT
Start: 2023-11-08 | End: 2023-11-09 | Stop reason: HOSPADM

## 2023-11-08 RX ORDER — LIDOCAINE 40 MG/G
CREAM TOPICAL ONCE
OUTPATIENT
Start: 2023-11-08 | End: 2023-11-08

## 2023-11-08 RX ORDER — TRIAMCINOLONE ACETONIDE 1 MG/G
OINTMENT TOPICAL ONCE
OUTPATIENT
Start: 2023-11-08 | End: 2023-11-08

## 2023-11-08 RX ORDER — LIDOCAINE HYDROCHLORIDE 40 MG/ML
SOLUTION TOPICAL ONCE
OUTPATIENT
Start: 2023-11-08 | End: 2023-11-08

## 2023-11-08 RX ORDER — GINSENG 100 MG
CAPSULE ORAL ONCE
OUTPATIENT
Start: 2023-11-08 | End: 2023-11-08

## 2023-11-08 RX ORDER — LIDOCAINE 50 MG/G
OINTMENT TOPICAL ONCE
OUTPATIENT
Start: 2023-11-08 | End: 2023-11-08

## 2023-11-08 RX ORDER — CLOBETASOL PROPIONATE 0.5 MG/G
OINTMENT TOPICAL ONCE
OUTPATIENT
Start: 2023-11-08 | End: 2023-11-08

## 2023-11-08 RX ORDER — GENTAMICIN SULFATE 1 MG/G
OINTMENT TOPICAL ONCE
OUTPATIENT
Start: 2023-11-08 | End: 2023-11-08

## 2023-11-08 RX ORDER — BACITRACIN ZINC AND POLYMYXIN B SULFATE 500; 1000 [USP'U]/G; [USP'U]/G
OINTMENT TOPICAL ONCE
OUTPATIENT
Start: 2023-11-08 | End: 2023-11-08

## 2023-11-08 NOTE — PLAN OF CARE
Discharge instructions given. Patient verbalized understanding. Return to [address] [place] [address] in 1 week(s).   Has appt with Dr Dinesh Carranza 11/22/23

## 2023-11-08 NOTE — PROGRESS NOTES
[address] [facility],Third Floor Progress and Procedure Note    Antonio Verdin  AGE: 46 y.o. GENDER: female    : 1970  TODAY'S DATE: 2023    Chief Complaint   Patient presents with    Wound Check     Follow up right leg leg        History of Present Illness     Antonio Verdin is a 46 y.o. female who presents today for wound evaluation. History of Wound: infectious and traumatic wound located on the right leg. Cat bite  Wound Pain:  mild  Severity: 2/10   Wound Type: infectious and traumatic  Modifying Factors:  edema  Associated Signs/Symptoms:  edema    Past Medical History:   Diagnosis Date    Disease of blood and blood forming organ     DVT of popliteal vein (HCC)     History of dilatation and curettage 2020     Past Surgical History:   Procedure Laterality Date    MOUTH SURGERY       Current Outpatient Medications   Medication Sig Dispense Refill    dilTIAZem (CARDIZEM) 30 MG tablet Take 1 tablet by mouth 4 times daily      fondaparinux (ARIXTRA) 7.5 MG/0.6ML SOLN injection 7.5 MG SUBCUTANEOUSLY DAILY. 1 DAILY      Cholecalciferol (VITAMIN D3) 125 MCG (5000 UT) TABS Take by mouth      potassium chloride (MICRO-K) 10 MEQ extended release capsule       hydroCHLOROthiazide (HYDRODIURIL) 25 MG tablet        Current Facility-Administered Medications   Medication Dose Route Frequency Provider Last Rate Last Admin    lidocaine (LMX) 4 % cream   Topical Once Zoila Carrillo MD          Social History:   Social History     Tobacco Use    Smoking status: Former     Packs/day: 1.00     Years: 10.00     Additional pack years: 0.00     Total pack years: 10.00     Types: Cigarettes    Smokeless tobacco: Never    Tobacco comments:     [id number]   Vaping Use    Vaping Use: Never used   Substance Use Topics    Alcohol use: Never    Drug use: Never     Allergies:  Bee venom    Procedure:      Indications:  Based on my examination of this patient's wound(s)/ulcer(s) today, debridement is

## 2023-11-15 ENCOUNTER — HOSPITAL ENCOUNTER (OUTPATIENT)
Dept: WOUND CARE | Age: 53
Discharge: HOME | End: 2023-11-15
Attending: SURGERY
Payer: COMMERCIAL

## 2023-11-15 DIAGNOSIS — S81.801A OPEN WOUND OF RIGHT LOWER LEG, INITIAL ENCOUNTER: Primary | ICD-10-CM

## 2023-11-15 PROCEDURE — 11042 DBRDMT SUBQ TIS 1ST 20SQCM/<: CPT | Performed by: SURGERY

## 2023-11-15 PROCEDURE — 11042 DBRDMT SUBQ TIS 1ST 20SQCM/<: CPT

## 2023-11-15 PROCEDURE — 29581 APPL MULTLAYER CMPRN SYS LEG: CPT

## 2023-11-15 RX ORDER — TRIAMCINOLONE ACETONIDE 1 MG/G
OINTMENT TOPICAL ONCE
OUTPATIENT
Start: 2023-11-15 | End: 2023-11-15

## 2023-11-15 RX ORDER — LIDOCAINE 40 MG/G
CREAM TOPICAL ONCE
OUTPATIENT
Start: 2023-11-15 | End: 2023-11-15

## 2023-11-15 RX ORDER — GINSENG 100 MG
CAPSULE ORAL ONCE
OUTPATIENT
Start: 2023-11-15 | End: 2023-11-15

## 2023-11-15 RX ORDER — BACITRACIN ZINC AND POLYMYXIN B SULFATE 500; 1000 [USP'U]/G; [USP'U]/G
OINTMENT TOPICAL ONCE
OUTPATIENT
Start: 2023-11-15 | End: 2023-11-15

## 2023-11-15 RX ORDER — IBUPROFEN 200 MG
TABLET ORAL ONCE
OUTPATIENT
Start: 2023-11-15 | End: 2023-11-15

## 2023-11-15 RX ORDER — LIDOCAINE 50 MG/G
OINTMENT TOPICAL ONCE
OUTPATIENT
Start: 2023-11-15 | End: 2023-11-15

## 2023-11-15 RX ORDER — LIDOCAINE 40 MG/G
CREAM TOPICAL ONCE
Status: DISCONTINUED | OUTPATIENT
Start: 2023-11-15 | End: 2023-11-16 | Stop reason: HOSPADM

## 2023-11-15 RX ORDER — LIDOCAINE HYDROCHLORIDE 20 MG/ML
JELLY TOPICAL ONCE
OUTPATIENT
Start: 2023-11-15 | End: 2023-11-15

## 2023-11-15 RX ORDER — LIDOCAINE HYDROCHLORIDE 40 MG/ML
SOLUTION TOPICAL ONCE
OUTPATIENT
Start: 2023-11-15 | End: 2023-11-15

## 2023-11-15 RX ORDER — GENTAMICIN SULFATE 1 MG/G
OINTMENT TOPICAL ONCE
OUTPATIENT
Start: 2023-11-15 | End: 2023-11-15

## 2023-11-15 RX ORDER — SODIUM CHLOR/HYPOCHLOROUS ACID 0.033 %
SOLUTION, IRRIGATION IRRIGATION ONCE
OUTPATIENT
Start: 2023-11-15 | End: 2023-11-15

## 2023-11-15 RX ORDER — CLOBETASOL PROPIONATE 0.5 MG/G
OINTMENT TOPICAL ONCE
OUTPATIENT
Start: 2023-11-15 | End: 2023-11-15

## 2023-11-15 NOTE — PATIENT INSTRUCTIONS
[address], [address] [facility]  Telephone: [phone]    Discharge Instructions    Important reminders:    **If you have any signs and symptoms of illness (Cough, fever, congestion, nausea, vomiting, diarrhea, etc.) please call the wound care center prior to your appointment. 1. Increase Protein intake for optimal wound healing  2. No added salt to reduce any swelling  3. If diabetic, maintain good glucose control  4. If you smoke, smoking prohibits wound healing, we ask that you refrain from smoking. 5. When taking antibiotics take the entire prescription as ordered. Do not stop taking until medication is all gone unless otherwise instructed. 6. Exercise as tolerated. 7. Keep weight off wounds and reposition every 2 hours if applicable. 8. If wound(s) is on your lower extremity, elevate legs to the level of the heart or above for 30 minutes 4-5 times a day and/or when sitting. Avoid standing for long periods of time. 9. Do not get wounds wet in bath or shower unless otherwise instructed by your physician. If your wound is on your foot or leg, you may purchase a cast bag. Please ask at the pharmacy. If Vascular testing is ordered, please call [address] [place] [address] [phone]) to schedule. Vascular tests ordered by Wound Care Physicians may take up to 2 hours to complete. Please keep that in mind when scheduling. If Vascular testing is scheduled, please bring supplies to replace your dressing after testing is done. The vascular department does not stock supplies. Wound: Right lower leg     With each dressing change, rinse wounds with 0.9% Saline. (May use wound wash or soft contact solution. Both can be purchased at a local drug store). If unable to obtain saline, may use a gentle soap and water. Dressing care: Triad, [address], Coflex Calamine- these will be changed at your next visit unless they slide down or cause pain.  If they

## 2023-11-15 NOTE — PROGRESS NOTES
Multilayer Compression Wrap   Below the Knee    NAME:  Ethan Becker  YOB: 1970  MEDICAL RECORD NUMBER:  [id number]  DATE:  11/15/2023    Multilayer compression wrap: Removed old Multilayer wrap if indicated and wash leg with mild soap/water. Applied moisturizing agent to dry skin as needed. Applied primary and secondary dressing as ordered. Applied multilayered dressing below the knee to right lower leg. Instructed patient/caregiver not to remove dressing and to keep it clean and dry. Instructed patient/caregiver on complications to report to provider, such as pain, numbness in toes, heavy drainage, and slippage of dressing. Instructed patient on purpose of compression dressing and on activity and exercise recommendations.      Applied  2 layer wrap from toes to knee overlapping each time    Electronically signed by Chivo Mcneill RN on 11/15/2023 at 11:54 AM

## 2023-11-15 NOTE — PLAN OF CARE
Discharge instructions given. Patient verbalized understanding. Return to [facility] in 2 week(s). Sees Dr Erinn Vilchis next Wednesday.

## 2023-11-15 NOTE — PROGRESS NOTES
[address] [facility],Third Floor Progress and Procedure Note    Bret Hernandez  AGE: 46 y.o. GENDER: female    : 1970  TODAY'S DATE: 11/15/2023    Chief Complaint   Patient presents with    Wound Check     Follow up leg wound        History of Present Illness     Bret Hernandez is a 46 y.o. female who presents today for wound evaluation. History of Wound: infectious and traumatic wound located on the right leg. Cat bite  Wound Pain:  mild  Severity: 2/10   Wound Type: infectious and traumatic  Modifying Factors:  edema  Associated Signs/Symptoms:  edema    Past Medical History:   Diagnosis Date    Disease of blood and blood forming organ     DVT of popliteal vein (HCC)     History of dilatation and curettage 2020     Past Surgical History:   Procedure Laterality Date    MOUTH SURGERY       Current Outpatient Medications   Medication Sig Dispense Refill    dilTIAZem (CARDIZEM) 30 MG tablet Take 1 tablet by mouth 4 times daily      fondaparinux (ARIXTRA) 7.5 MG/0.6ML SOLN injection 7.5 MG SUBCUTANEOUSLY DAILY. 1 DAILY      Cholecalciferol (VITAMIN D3) 125 MCG (5000 UT) TABS Take by mouth      potassium chloride (MICRO-K) 10 MEQ extended release capsule       hydroCHLOROthiazide (HYDRODIURIL) 25 MG tablet        Current Facility-Administered Medications   Medication Dose Route Frequency Provider Last Rate Last Admin    lidocaine (LMX) 4 % cream   Topical Once Chela Kruse MD          Social History:   Social History     Tobacco Use    Smoking status: Former     Packs/day: 1.00     Years: 10.00     Additional pack years: 0.00     Total pack years: 10.00     Types: Cigarettes    Smokeless tobacco: Never    Tobacco comments:     [id number]   Vaping Use    Vaping Use: Never used   Substance Use Topics    Alcohol use: Never    Drug use: Never     Allergies:  Bee venom    Procedure:      Indications:  Based on my examination of this patient's wound(s)/ulcer(s) today, debridement is required

## 2023-11-22 ENCOUNTER — OFFICE VISIT (OUTPATIENT)
Dept: VASCULAR SURGERY | Age: 53
End: 2023-11-22
Payer: COMMERCIAL

## 2023-11-22 VITALS — WEIGHT: 186 LBS | HEART RATE: 70 BPM | HEIGHT: 62 IN | BODY MASS INDEX: 34.23 KG/M2

## 2023-11-22 DIAGNOSIS — I87.009 POST-PHLEBITIC SYNDROME: Primary | ICD-10-CM

## 2023-11-22 DIAGNOSIS — S81.831D: ICD-10-CM

## 2023-11-22 PROCEDURE — G8484 FLU IMMUNIZE NO ADMIN: HCPCS | Performed by: SURGERY

## 2023-11-22 PROCEDURE — G8427 DOCREV CUR MEDS BY ELIG CLIN: HCPCS | Performed by: SURGERY

## 2023-11-22 PROCEDURE — 3017F COLORECTAL CA SCREEN DOC REV: CPT | Performed by: SURGERY

## 2023-11-22 PROCEDURE — G8417 CALC BMI ABV UP PARAM F/U: HCPCS | Performed by: SURGERY

## 2023-11-22 PROCEDURE — 99213 OFFICE O/P EST LOW 20 MIN: CPT | Performed by: SURGERY

## 2023-11-22 PROCEDURE — 1036F TOBACCO NON-USER: CPT | Performed by: SURGERY

## 2023-11-23 ASSESSMENT — ENCOUNTER SYMPTOMS
RESPIRATORY NEGATIVE: 1
EYES NEGATIVE: 1
ALLERGIC/IMMUNOLOGIC NEGATIVE: 1

## 2023-11-23 NOTE — PROGRESS NOTES
parinuxSubjective:      Patient ID: Anika Estes is a 46 y.o. female. HPI Previous referral from [address] [place] [address] for evaluation of a recent right leg DVT initially documented 11/18/2021 following right foot surgery and involved the right superficial femoral vein extending through the popliteal into the tibial vessels. Edema at that time significant and recommended stockings for control. Recent cat bite R upper inner calf - sutured in ER then flap necrosed leaving open wound. Now managed at [facility] by Dr. Holli Rodriguez with compression wraps (removed in office today). Pt reports wound has greatly improved since [facility] care begun. Past Medical History:   Diagnosis Date    Disease of blood and blood forming organ     DVT of popliteal vein (HCC)     History of dilatation and curettage 02/17/2020     Past Surgical History:   Procedure Laterality Date    MOUTH SURGERY       Allergies   Allergen Reactions    Bee Venom      Current Outpatient Medications   Medication Sig Dispense Refill    dilTIAZem (CARDIZEM) 30 MG tablet Take 1 tablet by mouth 4 times daily      fondaparinux (ARIXTRA) 7.5 MG/0.6ML SOLN injection 7.5 MG SUBCUTANEOUSLY DAILY. 1 DAILY      Cholecalciferol (VITAMIN D3) 125 MCG (5000 UT) TABS Take by mouth      potassium chloride (MICRO-K) 10 MEQ extended release capsule       hydroCHLOROthiazide (HYDRODIURIL) 25 MG tablet        No current facility-administered medications for this visit.      Social History     Socioeconomic History    Marital status:      Spouse name: Not on file    Number of children: Not on file    Years of education: Not on file    Highest education level: Not on file   Occupational History    Not on file   Tobacco Use    Smoking status: Former     Packs/day: 1.00     Years: 10.00     Additional pack years: 0.00     Total pack years: 10.00     Types: Cigarettes    Smokeless tobacco: Never    Tobacco comments:     [id number]   Vaping Use    Vaping Use: Never used   Substance

## 2023-11-29 ENCOUNTER — HOSPITAL ENCOUNTER (OUTPATIENT)
Dept: WOUND CARE | Age: 53
Discharge: HOME | End: 2023-11-29
Attending: SURGERY
Payer: COMMERCIAL

## 2023-11-29 DIAGNOSIS — S81.801A OPEN WOUND OF RIGHT LOWER LEG, INITIAL ENCOUNTER: Primary | ICD-10-CM

## 2023-11-29 PROCEDURE — 29581 APPL MULTLAYER CMPRN SYS LEG: CPT

## 2023-11-29 PROCEDURE — 11042 DBRDMT SUBQ TIS 1ST 20SQCM/<: CPT | Performed by: SURGERY

## 2023-11-29 PROCEDURE — 11042 DBRDMT SUBQ TIS 1ST 20SQCM/<: CPT

## 2023-11-29 RX ORDER — GENTAMICIN SULFATE 1 MG/G
OINTMENT TOPICAL ONCE
OUTPATIENT
Start: 2023-11-29 | End: 2023-11-29

## 2023-11-29 RX ORDER — LIDOCAINE HYDROCHLORIDE 20 MG/ML
JELLY TOPICAL ONCE
OUTPATIENT
Start: 2023-11-29 | End: 2023-11-29

## 2023-11-29 RX ORDER — LIDOCAINE 40 MG/G
CREAM TOPICAL ONCE
OUTPATIENT
Start: 2023-11-29 | End: 2023-11-29

## 2023-11-29 RX ORDER — BACITRACIN ZINC AND POLYMYXIN B SULFATE 500; 1000 [USP'U]/G; [USP'U]/G
OINTMENT TOPICAL ONCE
OUTPATIENT
Start: 2023-11-29 | End: 2023-11-29

## 2023-11-29 RX ORDER — TRIAMCINOLONE ACETONIDE 1 MG/G
OINTMENT TOPICAL ONCE
OUTPATIENT
Start: 2023-11-29 | End: 2023-11-29

## 2023-11-29 RX ORDER — LIDOCAINE 50 MG/G
OINTMENT TOPICAL ONCE
OUTPATIENT
Start: 2023-11-29 | End: 2023-11-29

## 2023-11-29 RX ORDER — CLOBETASOL PROPIONATE 0.5 MG/G
OINTMENT TOPICAL ONCE
OUTPATIENT
Start: 2023-11-29 | End: 2023-11-29

## 2023-11-29 RX ORDER — SODIUM CHLOR/HYPOCHLOROUS ACID 0.033 %
SOLUTION, IRRIGATION IRRIGATION ONCE
OUTPATIENT
Start: 2023-11-29 | End: 2023-11-29

## 2023-11-29 RX ORDER — LIDOCAINE 40 MG/G
CREAM TOPICAL ONCE
Status: DISCONTINUED | OUTPATIENT
Start: 2023-11-29 | End: 2023-11-30 | Stop reason: HOSPADM

## 2023-11-29 RX ORDER — GINSENG 100 MG
CAPSULE ORAL ONCE
OUTPATIENT
Start: 2023-11-29 | End: 2023-11-29

## 2023-11-29 RX ORDER — LIDOCAINE HYDROCHLORIDE 40 MG/ML
SOLUTION TOPICAL ONCE
OUTPATIENT
Start: 2023-11-29 | End: 2023-11-29

## 2023-11-29 RX ORDER — IBUPROFEN 200 MG
TABLET ORAL ONCE
OUTPATIENT
Start: 2023-11-29 | End: 2023-11-29

## 2023-11-29 NOTE — PROGRESS NOTES
[address] [facility],Third Floor Progress and Procedure Note    Tricia Sharma  AGE: 46 y.o. GENDER: female    : 1970  TODAY'S DATE: 2023    No chief complaint on file. History of Present Illness     Tricia Sharma is a 46 y.o. female who presents today for wound evaluation. History of Wound: infectious and traumatic wound located on the right leg. Cat bite  Wound Pain:  mild  Severity: 2/10   Wound Type: infectious and traumatic  Modifying Factors:  edema  Associated Signs/Symptoms:  edema    Past Medical History:   Diagnosis Date    Disease of blood and blood forming organ     DVT of popliteal vein (HCC)     History of dilatation and curettage 2020     Past Surgical History:   Procedure Laterality Date    MOUTH SURGERY       Current Outpatient Medications   Medication Sig Dispense Refill    dilTIAZem (CARDIZEM) 30 MG tablet Take 1 tablet by mouth 4 times daily      fondaparinux (ARIXTRA) 7.5 MG/0.6ML SOLN injection 7.5 MG SUBCUTANEOUSLY DAILY. 1 DAILY      Cholecalciferol (VITAMIN D3) 125 MCG (5000 UT) TABS Take by mouth      potassium chloride (MICRO-K) 10 MEQ extended release capsule       hydroCHLOROthiazide (HYDRODIURIL) 25 MG tablet        Current Facility-Administered Medications   Medication Dose Route Frequency Provider Last Rate Last Admin    lidocaine (LMX) 4 % cream   Topical Once Dionne Porter MD          Social History:   Social History     Tobacco Use    Smoking status: Former     Packs/day: 1.00     Years: 10.00     Additional pack years: 0.00     Total pack years: 10.00     Types: Cigarettes    Smokeless tobacco: Never    Tobacco comments:     [id number]   Vaping Use    Vaping Use: Never used   Substance Use Topics    Alcohol use: Never    Drug use: Never     Allergies:  Bee venom    Procedure:      Indications:  Based on my examination of this patient's wound(s)/ulcer(s) today, debridement is required to promote healing and evaluate the wound

## 2023-11-29 NOTE — PLAN OF CARE
Discharge instructions given. Patient verbalized understanding. Return to [address] [place] [address] in 1 week(s).

## 2023-11-29 NOTE — PATIENT INSTRUCTIONS
[address], [address] [facility]  Telephone: [phone]    Discharge Instructions    Important reminders:    **If you have any signs and symptoms of illness (Cough, fever, congestion, nausea, vomiting, diarrhea, etc.) please call the wound care center prior to your appointment. 1. Increase Protein intake for optimal wound healing  2. No added salt to reduce any swelling  3. If diabetic, maintain good glucose control  4. If you smoke, smoking prohibits wound healing, we ask that you refrain from smoking. 5. When taking antibiotics take the entire prescription as ordered. Do not stop taking until medication is all gone unless otherwise instructed. 6. Exercise as tolerated. 7. Keep weight off wounds and reposition every 2 hours if applicable. 8. If wound(s) is on your lower extremity, elevate legs to the level of the heart or above for 30 minutes 4-5 times a day and/or when sitting. Avoid standing for long periods of time. 9. Do not get wounds wet in bath or shower unless otherwise instructed by your physician. If your wound is on your foot or leg, you may purchase a cast bag. Please ask at the pharmacy. If Vascular testing is ordered, please call [address] [place] [address] [phone]) to schedule. Vascular tests ordered by Wound Care Physicians may take up to 2 hours to complete. Please keep that in mind when scheduling. If Vascular testing is scheduled, please bring supplies to replace your dressing after testing is done. The vascular department does not stock supplies. Wound: Right lower leg     With each dressing change, rinse wounds with 0.9% Saline. (May use wound wash or soft contact solution. Both can be purchased at a local drug store). If unable to obtain saline, may use a gentle soap and water. Dressing care: Triad, [address], Coflex Calamine- these will be changed at your next visit unless they slide down or cause pain.  If they

## 2023-12-05 NOTE — PATIENT INSTRUCTIONS
[address], [address] [facility]  Telephone: [phone]    Discharge Instructions    Important reminders:    **If you have any signs and symptoms of illness (Cough, fever, congestion, nausea, vomiting, diarrhea, etc.) please call the wound care center prior to your appointment. 1. Increase Protein intake for optimal wound healing  2. No added salt to reduce any swelling  3. If diabetic, maintain good glucose control  4. If you smoke, smoking prohibits wound healing, we ask that you refrain from smoking. 5. When taking antibiotics take the entire prescription as ordered. Do not stop taking until medication is all gone unless otherwise instructed. 6. Exercise as tolerated. 7. Keep weight off wounds and reposition every 2 hours if applicable. 8. If wound(s) is on your lower extremity, elevate legs to the level of the heart or above for 30 minutes 4-5 times a day and/or when sitting. Avoid standing for long periods of time. 9. Do not get wounds wet in bath or shower unless otherwise instructed by your physician. If your wound is on your foot or leg, you may purchase a cast bag. Please ask at the pharmacy. If Vascular testing is ordered, please call [address] [place] [address] [phone]) to schedule. Vascular tests ordered by Wound Care Physicians may take up to 2 hours to complete. Please keep that in mind when scheduling. If Vascular testing is scheduled, please bring supplies to replace your dressing after testing is done. The vascular department does not stock supplies. Wound: Right lower leg     With each dressing change, rinse wounds with 0.9% Saline. (May use wound wash or soft contact solution. Both can be purchased at a local drug store). If unable to obtain saline, may use a gentle soap and water. Dressing care:  Antibiotic ointment, 4 x 4's, Coflex Calamine- these will be changed at your next visit unless they slide down or cause

## 2023-12-06 ENCOUNTER — HOSPITAL ENCOUNTER (OUTPATIENT)
Dept: WOUND CARE | Age: 53
Discharge: HOME | End: 2023-12-06
Attending: SURGERY
Payer: COMMERCIAL

## 2023-12-06 VITALS
DIASTOLIC BLOOD PRESSURE: 77 MMHG | RESPIRATION RATE: 16 BRPM | SYSTOLIC BLOOD PRESSURE: 115 MMHG | HEART RATE: 69 BPM | TEMPERATURE: 96.9 F

## 2023-12-06 DIAGNOSIS — S81.801A OPEN WOUND OF RIGHT LOWER LEG, INITIAL ENCOUNTER: Primary | ICD-10-CM

## 2023-12-06 PROCEDURE — 11042 DBRDMT SUBQ TIS 1ST 20SQCM/<: CPT

## 2023-12-06 PROCEDURE — 29581 APPL MULTLAYER CMPRN SYS LEG: CPT

## 2023-12-06 PROCEDURE — 11042 DBRDMT SUBQ TIS 1ST 20SQCM/<: CPT | Performed by: SURGERY

## 2023-12-06 RX ORDER — LIDOCAINE 50 MG/G
OINTMENT TOPICAL ONCE
OUTPATIENT
Start: 2023-12-06 | End: 2023-12-06

## 2023-12-06 RX ORDER — GENTAMICIN SULFATE 1 MG/G
OINTMENT TOPICAL ONCE
OUTPATIENT
Start: 2023-12-06 | End: 2023-12-06

## 2023-12-06 RX ORDER — LIDOCAINE 40 MG/G
CREAM TOPICAL ONCE
OUTPATIENT
Start: 2023-12-06 | End: 2023-12-06

## 2023-12-06 RX ORDER — SODIUM CHLOR/HYPOCHLOROUS ACID 0.033 %
SOLUTION, IRRIGATION IRRIGATION ONCE
OUTPATIENT
Start: 2023-12-06 | End: 2023-12-06

## 2023-12-06 RX ORDER — CLOBETASOL PROPIONATE 0.5 MG/G
OINTMENT TOPICAL ONCE
OUTPATIENT
Start: 2023-12-06 | End: 2023-12-06

## 2023-12-06 RX ORDER — LIDOCAINE HYDROCHLORIDE 40 MG/ML
SOLUTION TOPICAL ONCE
OUTPATIENT
Start: 2023-12-06 | End: 2023-12-06

## 2023-12-06 RX ORDER — GINSENG 100 MG
CAPSULE ORAL ONCE
OUTPATIENT
Start: 2023-12-06 | End: 2023-12-06

## 2023-12-06 RX ORDER — LIDOCAINE 40 MG/G
CREAM TOPICAL ONCE
Status: DISCONTINUED | OUTPATIENT
Start: 2023-12-06 | End: 2023-12-07 | Stop reason: HOSPADM

## 2023-12-06 RX ORDER — LIDOCAINE HYDROCHLORIDE 20 MG/ML
JELLY TOPICAL ONCE
OUTPATIENT
Start: 2023-12-06 | End: 2023-12-06

## 2023-12-06 RX ORDER — IBUPROFEN 200 MG
TABLET ORAL ONCE
OUTPATIENT
Start: 2023-12-06 | End: 2023-12-06

## 2023-12-06 RX ORDER — TRIAMCINOLONE ACETONIDE 1 MG/G
OINTMENT TOPICAL ONCE
OUTPATIENT
Start: 2023-12-06 | End: 2023-12-06

## 2023-12-06 RX ORDER — BACITRACIN ZINC AND POLYMYXIN B SULFATE 500; 1000 [USP'U]/G; [USP'U]/G
OINTMENT TOPICAL ONCE
OUTPATIENT
Start: 2023-12-06 | End: 2023-12-06

## 2023-12-06 NOTE — PLAN OF CARE
Discharge instructions given. Patient verbalized understanding. Return to [facility] in 1 week(s).

## 2023-12-06 NOTE — PROGRESS NOTES
Multilayer Compression Wrap   Below the Knee    NAME:  Bret Hernandez  YOB: 1970  MEDICAL RECORD NUMBER:  [id number]  DATE:  12/6/2023    Multilayer compression wrap: Removed old Multilayer wrap if indicated and wash leg with mild soap/water. Applied moisturizing agent to dry skin as needed. Applied primary and secondary dressing as ordered. Applied multilayered dressing below the knee to right lower leg. Instructed patient/caregiver not to remove dressing and to keep it clean and dry. Instructed patient/caregiver on complications to report to provider, such as pain, numbness in toes, heavy drainage, and slippage of dressing. Instructed patient on purpose of compression dressing and on activity and exercise recommendations.      Applied  2 layer wrap from toes to knee overlapping each time    Electronically signed by Ernestine Greer RN on 12/6/2023 at 10:56 AM
you took a few minutes to share your experience. Your input is very valuable to us. Delfino Nuñez MD, FACS  12/6/2023  3:06 PM

## 2023-12-13 ENCOUNTER — HOSPITAL ENCOUNTER (OUTPATIENT)
Dept: WOUND CARE | Age: 53
Discharge: HOME | End: 2023-12-13
Attending: SURGERY
Payer: COMMERCIAL

## 2023-12-13 VITALS
DIASTOLIC BLOOD PRESSURE: 82 MMHG | RESPIRATION RATE: 16 BRPM | TEMPERATURE: 96.3 F | SYSTOLIC BLOOD PRESSURE: 123 MMHG | HEART RATE: 59 BPM

## 2023-12-13 DIAGNOSIS — S81.801A OPEN WOUND OF RIGHT LOWER LEG, INITIAL ENCOUNTER: Primary | ICD-10-CM

## 2023-12-13 PROCEDURE — 99212 OFFICE O/P EST SF 10 MIN: CPT

## 2023-12-13 PROCEDURE — 99212 OFFICE O/P EST SF 10 MIN: CPT | Performed by: SURGERY

## 2023-12-13 RX ORDER — LIDOCAINE 40 MG/G
CREAM TOPICAL ONCE
Status: CANCELLED | OUTPATIENT
Start: 2023-12-13 | End: 2023-12-13

## 2023-12-13 RX ORDER — LIDOCAINE 50 MG/G
OINTMENT TOPICAL ONCE
Status: CANCELLED | OUTPATIENT
Start: 2023-12-13 | End: 2023-12-13

## 2023-12-13 RX ORDER — BACITRACIN ZINC AND POLYMYXIN B SULFATE 500; 1000 [USP'U]/G; [USP'U]/G
OINTMENT TOPICAL ONCE
Status: CANCELLED | OUTPATIENT
Start: 2023-12-13 | End: 2023-12-13

## 2023-12-13 RX ORDER — LIDOCAINE HYDROCHLORIDE 40 MG/ML
SOLUTION TOPICAL ONCE
Status: CANCELLED | OUTPATIENT
Start: 2023-12-13 | End: 2023-12-13

## 2023-12-13 RX ORDER — IBUPROFEN 200 MG
TABLET ORAL ONCE
Status: CANCELLED | OUTPATIENT
Start: 2023-12-13 | End: 2023-12-13

## 2023-12-13 RX ORDER — LIDOCAINE 40 MG/G
CREAM TOPICAL ONCE
Status: DISCONTINUED | OUTPATIENT
Start: 2023-12-13 | End: 2023-12-14 | Stop reason: HOSPADM

## 2023-12-13 RX ORDER — CLOBETASOL PROPIONATE 0.5 MG/G
OINTMENT TOPICAL ONCE
Status: CANCELLED | OUTPATIENT
Start: 2023-12-13 | End: 2023-12-13

## 2023-12-13 RX ORDER — LIDOCAINE HYDROCHLORIDE 20 MG/ML
JELLY TOPICAL ONCE
Status: CANCELLED | OUTPATIENT
Start: 2023-12-13 | End: 2023-12-13

## 2023-12-13 RX ORDER — SODIUM CHLOR/HYPOCHLOROUS ACID 0.033 %
SOLUTION, IRRIGATION IRRIGATION ONCE
Status: CANCELLED | OUTPATIENT
Start: 2023-12-13 | End: 2023-12-13

## 2023-12-13 RX ORDER — GINSENG 100 MG
CAPSULE ORAL ONCE
Status: CANCELLED | OUTPATIENT
Start: 2023-12-13 | End: 2023-12-13

## 2023-12-13 RX ORDER — TRIAMCINOLONE ACETONIDE 1 MG/G
OINTMENT TOPICAL ONCE
Status: CANCELLED | OUTPATIENT
Start: 2023-12-13 | End: 2023-12-13

## 2023-12-13 RX ORDER — GENTAMICIN SULFATE 1 MG/G
OINTMENT TOPICAL ONCE
Status: CANCELLED | OUTPATIENT
Start: 2023-12-13 | End: 2023-12-13

## 2023-12-13 NOTE — PATIENT INSTRUCTIONS
Congratulations! You have completed your treatment program. We have outlined a list of reminders to assist you in maintaining your continues health. Return to your primary care physician for all your health issues. Resume your ordinary activities as prescribed. Take your medications as prescribed by your doctor. Check your skin daily for cracks, bruises, sores, or dryness. Clean and dry your skin thoroughly. Avoid alcohol. Quit smoking if applicable. Maintain a nutritious diet; take a multivitamin daily. Avoid pressure on the wound site. Keep your legs elevated above the level of your heart whenever possible. Continue to use wraps/stockings/compresion if prescribed/  Replace compression hose/stockings every 6 months to insure proper fit. Wear well fitting shoes.     Call the [facility] [address] if your wound reopens or you develop new wounds or if you have any other questions about follow up care  [phone]

## 2023-12-13 NOTE — PLAN OF CARE
Discharge instructions given. Patient verbalized understanding.   Return to [facility] as needed   Wound healed

## 2024-01-09 NOTE — PATIENT INSTRUCTIONS
[facility]  [address]   [place], [place] [address]  Telephone: [phone]     FAX [phone]    Discharge Instructions    Important reminders:    **If you have any signs and symptoms of illness (Cough, fever, congestion, nausea, vomiting, diarrhea, etc.) please call the wound care center prior to your appointment.    1. Increase Protein intake for optimal wound healing  2. No added salt to reduce any swelling  3. If diabetic, maintain good glucose control  4. If you smoke, smoking prohibits wound healing, we ask that you refrain from smoking.  5. When taking antibiotics take the entire prescription as ordered. Do not stop taking until medication is all gone unless otherwise instructed.   6. Exercise as tolerated.   7. Keep weight off wounds and reposition every 2 hours if applicable.  8. If wound(s) is on your lower extremity, elevate legs to the level of the heart or above for 30 minutes 4-5 times a day and/or when sitting. Avoid standing for long periods of time.   9. Do not get wounds wet in bath or shower unless otherwise instructed by your physician. If your wound is on your foot or leg, you may purchase a cast bag. Please ask at the pharmacy.      If Vascular testing is ordered, please call [address] [place] [address] [phone]) to schedule.    Vascular tests ordered by Wound Care Physicians may take up to 2 hours to complete. Please keep that in mind when scheduling.     If Vascular testing is scheduled, please bring supplies to replace your dressing after testing is done. The vascular department does not stock supplies.     Wound: Right lower leg     With each dressing change, rinse wounds with 0.9% Saline. (May use wound wash or soft contact solution. Both can be purchased at a local drug store). If unable to obtain saline, may use a gentle soap and water.    Dressing care: Antibiotic ointment, gauze and tape- change daily    Home Care Agency/Facility:     Your wound-care supplies will be

## 2024-01-10 ENCOUNTER — HOSPITAL ENCOUNTER (OUTPATIENT)
Dept: WOUND CARE | Age: 54
Discharge: HOME | End: 2024-01-10
Attending: SURGERY
Payer: COMMERCIAL

## 2024-01-10 VITALS
SYSTOLIC BLOOD PRESSURE: 118 MMHG | TEMPERATURE: 97.1 F | RESPIRATION RATE: 16 BRPM | DIASTOLIC BLOOD PRESSURE: 8 MMHG | HEART RATE: 67 BPM

## 2024-01-10 DIAGNOSIS — S81.801A OPEN WOUND OF RIGHT LOWER LEG, INITIAL ENCOUNTER: Primary | ICD-10-CM

## 2024-01-10 PROCEDURE — 11042 DBRDMT SUBQ TIS 1ST 20SQCM/<: CPT

## 2024-01-10 PROCEDURE — 11042 DBRDMT SUBQ TIS 1ST 20SQCM/<: CPT | Performed by: SURGERY

## 2024-01-10 RX ORDER — BETAMETHASONE DIPROPIONATE 0.5 MG/G
CREAM TOPICAL ONCE
OUTPATIENT
Start: 2024-01-10 | End: 2024-01-10

## 2024-01-10 RX ORDER — SODIUM CHLOR/HYPOCHLOROUS ACID 0.033 %
SOLUTION, IRRIGATION IRRIGATION ONCE
OUTPATIENT
Start: 2024-01-10 | End: 2024-01-10

## 2024-01-10 RX ORDER — BACITRACIN ZINC AND POLYMYXIN B SULFATE 500; 1000 [USP'U]/G; [USP'U]/G
OINTMENT TOPICAL ONCE
OUTPATIENT
Start: 2024-01-10 | End: 2024-01-10

## 2024-01-10 RX ORDER — LIDOCAINE HYDROCHLORIDE 40 MG/ML
SOLUTION TOPICAL ONCE
OUTPATIENT
Start: 2024-01-10 | End: 2024-01-10

## 2024-01-10 RX ORDER — GENTAMICIN SULFATE 1 MG/G
OINTMENT TOPICAL ONCE
OUTPATIENT
Start: 2024-01-10 | End: 2024-01-10

## 2024-01-10 RX ORDER — BACITRACIN ZINC AND POLYMYXIN B SULFATE 500; 1000 [USP'U]/G; [USP'U]/G
OINTMENT TOPICAL ONCE
Status: DISCONTINUED | OUTPATIENT
Start: 2024-01-10 | End: 2024-01-11 | Stop reason: HOSPADM

## 2024-01-10 RX ORDER — TRIAMCINOLONE ACETONIDE 1 MG/G
OINTMENT TOPICAL ONCE
OUTPATIENT
Start: 2024-01-10 | End: 2024-01-10

## 2024-01-10 RX ORDER — CLOBETASOL PROPIONATE 0.5 MG/G
OINTMENT TOPICAL ONCE
OUTPATIENT
Start: 2024-01-10 | End: 2024-01-10

## 2024-01-10 RX ORDER — IBUPROFEN 200 MG
TABLET ORAL ONCE
OUTPATIENT
Start: 2024-01-10 | End: 2024-01-10

## 2024-01-10 RX ORDER — GINSENG 100 MG
CAPSULE ORAL ONCE
OUTPATIENT
Start: 2024-01-10 | End: 2024-01-10

## 2024-01-10 RX ORDER — LIDOCAINE HYDROCHLORIDE 20 MG/ML
JELLY TOPICAL ONCE
OUTPATIENT
Start: 2024-01-10 | End: 2024-01-10

## 2024-01-10 RX ORDER — LIDOCAINE 40 MG/G
CREAM TOPICAL ONCE
Status: DISCONTINUED | OUTPATIENT
Start: 2024-01-10 | End: 2024-01-11 | Stop reason: HOSPADM

## 2024-01-10 RX ORDER — LIDOCAINE 40 MG/G
CREAM TOPICAL ONCE
OUTPATIENT
Start: 2024-01-10 | End: 2024-01-10

## 2024-01-10 RX ORDER — LIDOCAINE 50 MG/G
OINTMENT TOPICAL ONCE
OUTPATIENT
Start: 2024-01-10 | End: 2024-01-10

## 2024-01-10 NOTE — PROGRESS NOTES
[facility] Progress and Procedure Note    Claribel Payne  AGE: 53 y.o.     GENDER: female    : 1970  TODAY'S DATE: 1/10/2024    Chief Complaint   Patient presents with    Wound Check     right lower extremity        History of Present Illness     Claribel Payne is a 53 y.o. female who presents today for wound evaluation.   History of Wound: infectious and traumatic wound located on the right leg. Originally cat bite, healed, but opened again  Wound Pain:  mild  Severity: 2/10   Wound Type: infectious and traumatic  Modifying Factors:  edema  Associated Signs/Symptoms:  edema    Past Medical History:   Diagnosis Date    Disease of blood and blood forming organ     DVT of popliteal vein (HCC)     History of dilatation and curettage 2020     Past Surgical History:   Procedure Laterality Date    MOUTH SURGERY       Current Outpatient Medications   Medication Sig Dispense Refill    dilTIAZem (CARDIZEM) 30 MG tablet Take 1 tablet by mouth 4 times daily      fondaparinux (ARIXTRA) 7.5 MG/0.6ML SOLN injection 7.5 MG SUBCUTANEOUSLY DAILY. 1 DAILY      Cholecalciferol (VITAMIN D3) 125 MCG (5000 UT) TABS Take by mouth      potassium chloride (MICRO-K) 10 MEQ extended release capsule       hydroCHLOROthiazide (HYDRODIURIL) 25 MG tablet        No current facility-administered medications for this encounter.      Social History:   Social History     Tobacco Use    Smoking status: Former     Current packs/day: 1.00     Average packs/day: 1 pack/day for 10.0 years (10.0 ttl pk-yrs)     Types: Cigarettes    Smokeless tobacco: Never    Tobacco comments:     [id number]   Vaping Use    Vaping Use: Never used   Substance Use Topics    Alcohol use: Never    Drug use: Never     Allergies:  Bee venom    Procedure:     Indications:  Based on my examination of this patient's wound(s)/ulcer(s) today, debridement is required to promote healing and evaluate the wound base.    Performed by: AIMEE

## 2024-01-10 NOTE — PLAN OF CARE
Discharge instructions given.  Patient verbalized understanding.  Return to [facility] in 1 week(s).

## 2024-01-16 NOTE — PATIENT INSTRUCTIONS
[facility]  [address]   [place], [place] [address]  Telephone: [phone]     FAX [phone]    Discharge Instructions    Important reminders:    **If you have any signs and symptoms of illness (Cough, fever, congestion, nausea, vomiting, diarrhea, etc.) please call the wound care center prior to your appointment.    1. Increase Protein intake for optimal wound healing  2. No added salt to reduce any swelling  3. If diabetic, maintain good glucose control  4. If you smoke, smoking prohibits wound healing, we ask that you refrain from smoking.  5. When taking antibiotics take the entire prescription as ordered. Do not stop taking until medication is all gone unless otherwise instructed.   6. Exercise as tolerated.   7. Keep weight off wounds and reposition every 2 hours if applicable.  8. If wound(s) is on your lower extremity, elevate legs to the level of the heart or above for 30 minutes 4-5 times a day and/or when sitting. Avoid standing for long periods of time.   9. Do not get wounds wet in bath or shower unless otherwise instructed by your physician. If your wound is on your foot or leg, you may purchase a cast bag. Please ask at the pharmacy.      If Vascular testing is ordered, please call [address] [place] [address] [phone]) to schedule.    Vascular tests ordered by Wound Care Physicians may take up to 2 hours to complete. Please keep that in mind when scheduling.     If Vascular testing is scheduled, please bring supplies to replace your dressing after testing is done. The vascular department does not stock supplies.     Wound: Right lower leg     With each dressing change, rinse wounds with 0.9% Saline. (May use wound wash or soft contact solution. Both can be purchased at a local drug store). If unable to obtain saline, may use a gentle soap and water.    Dressing care: Gauze and tape- (you can use a larger bandaid at home) change daily.Warm compress to leg through out the day. Do not pop or

## 2024-01-17 ENCOUNTER — HOSPITAL ENCOUNTER (OUTPATIENT)
Dept: WOUND CARE | Age: 54
Discharge: HOME | End: 2024-01-17
Attending: SURGERY
Payer: COMMERCIAL

## 2024-01-17 DIAGNOSIS — S81.801A OPEN WOUND OF RIGHT LOWER LEG, INITIAL ENCOUNTER: Primary | ICD-10-CM

## 2024-01-17 PROCEDURE — 11042 DBRDMT SUBQ TIS 1ST 20SQCM/<: CPT | Performed by: SURGERY

## 2024-01-17 PROCEDURE — 11042 DBRDMT SUBQ TIS 1ST 20SQCM/<: CPT

## 2024-01-17 RX ORDER — CLOBETASOL PROPIONATE 0.5 MG/G
OINTMENT TOPICAL ONCE
OUTPATIENT
Start: 2024-01-17 | End: 2024-01-17

## 2024-01-17 RX ORDER — TRIAMCINOLONE ACETONIDE 1 MG/G
OINTMENT TOPICAL ONCE
OUTPATIENT
Start: 2024-01-17 | End: 2024-01-17

## 2024-01-17 RX ORDER — BETAMETHASONE DIPROPIONATE 0.5 MG/G
CREAM TOPICAL ONCE
OUTPATIENT
Start: 2024-01-17 | End: 2024-01-17

## 2024-01-17 RX ORDER — LIDOCAINE 40 MG/G
CREAM TOPICAL ONCE
OUTPATIENT
Start: 2024-01-17 | End: 2024-01-17

## 2024-01-17 RX ORDER — LIDOCAINE HYDROCHLORIDE 40 MG/ML
SOLUTION TOPICAL ONCE
OUTPATIENT
Start: 2024-01-17 | End: 2024-01-17

## 2024-01-17 RX ORDER — LIDOCAINE 40 MG/G
CREAM TOPICAL ONCE
Status: DISCONTINUED | OUTPATIENT
Start: 2024-01-17 | End: 2024-01-18 | Stop reason: HOSPADM

## 2024-01-17 RX ORDER — LIDOCAINE 50 MG/G
OINTMENT TOPICAL ONCE
OUTPATIENT
Start: 2024-01-17 | End: 2024-01-17

## 2024-01-17 RX ORDER — LIDOCAINE HYDROCHLORIDE 20 MG/ML
JELLY TOPICAL ONCE
OUTPATIENT
Start: 2024-01-17 | End: 2024-01-17

## 2024-01-17 RX ORDER — GENTAMICIN SULFATE 1 MG/G
OINTMENT TOPICAL ONCE
OUTPATIENT
Start: 2024-01-17 | End: 2024-01-17

## 2024-01-17 RX ORDER — GINSENG 100 MG
CAPSULE ORAL ONCE
OUTPATIENT
Start: 2024-01-17 | End: 2024-01-17

## 2024-01-17 RX ORDER — IBUPROFEN 200 MG
TABLET ORAL ONCE
OUTPATIENT
Start: 2024-01-17 | End: 2024-01-17

## 2024-01-17 RX ORDER — BACITRACIN ZINC AND POLYMYXIN B SULFATE 500; 1000 [USP'U]/G; [USP'U]/G
OINTMENT TOPICAL ONCE
OUTPATIENT
Start: 2024-01-17 | End: 2024-01-17

## 2024-01-17 RX ORDER — SODIUM CHLOR/HYPOCHLOROUS ACID 0.033 %
SOLUTION, IRRIGATION IRRIGATION ONCE
OUTPATIENT
Start: 2024-01-17 | End: 2024-01-17

## 2024-01-17 NOTE — PLAN OF CARE
Discharge instructions given.  Patient verbalized understanding.  Return to [facility] in 1 week(s).

## 2024-01-17 NOTE — PROGRESS NOTES
[facility] Progress and Procedure Note    Claribel Payne  AGE: 53 y.o.     GENDER: female    : 1970  TODAY'S DATE: 2024    Chief Complaint   Patient presents with    Wound Check     Right leg F/U        History of Present Illness     Claribel Payne is a 53 y.o. female who presents today for wound evaluation.   History of Wound: infectious and traumatic wound located on the right leg. Originally cat bite, healed, but opened again   Wound Pain:  mild  Severity: 2/10   Wound Type: infectious and traumatic  Modifying Factors:  edema  Associated Signs/Symptoms:  edema    Past Medical History:   Diagnosis Date    Disease of blood and blood forming organ     DVT of popliteal vein (HCC)     History of dilatation and curettage 2020     Past Surgical History:   Procedure Laterality Date    MOUTH SURGERY       Current Outpatient Medications   Medication Sig Dispense Refill    dilTIAZem (CARDIZEM) 30 MG tablet Take 1 tablet by mouth 4 times daily      fondaparinux (ARIXTRA) 7.5 MG/0.6ML SOLN injection 7.5 MG SUBCUTANEOUSLY DAILY. 1 DAILY      Cholecalciferol (VITAMIN D3) 125 MCG (5000 UT) TABS Take by mouth      potassium chloride (MICRO-K) 10 MEQ extended release capsule       hydroCHLOROthiazide (HYDRODIURIL) 25 MG tablet        Current Facility-Administered Medications   Medication Dose Route Frequency Provider Last Rate Last Admin    lidocaine (LMX) 4 % cream   Topical Once To Frank MD          Social History:   Social History     Tobacco Use    Smoking status: Former     Current packs/day: 1.00     Average packs/day: 1 pack/day for 10.0 years (10.0 ttl pk-yrs)     Types: Cigarettes    Smokeless tobacco: Never    Tobacco comments:     [id number]   Vaping Use    Vaping Use: Never used   Substance Use Topics    Alcohol use: Never    Drug use: Never     Allergies:  Bee venom    Procedure:     Indications:  Based on my examination of this patient's wound(s)/ulcer(s)

## 2024-05-22 ENCOUNTER — HOSPITAL ENCOUNTER (EMERGENCY)
Age: 54
Discharge: HOME | End: 2024-05-22
Payer: COMMERCIAL

## 2024-05-22 VITALS
TEMPERATURE: 98.4 F | HEIGHT: 62 IN | SYSTOLIC BLOOD PRESSURE: 144 MMHG | HEART RATE: 78 BPM | RESPIRATION RATE: 16 BRPM | OXYGEN SATURATION: 96 % | DIASTOLIC BLOOD PRESSURE: 86 MMHG | BODY MASS INDEX: 34.04 KG/M2 | WEIGHT: 185 LBS

## 2024-05-22 DIAGNOSIS — M79.605 ACUTE LEG PAIN, LEFT: Primary | ICD-10-CM

## 2024-05-22 DIAGNOSIS — R79.89 ELEVATED D-DIMER: ICD-10-CM

## 2024-05-22 LAB — D-DIMER QUANTITATIVE: 1.14 UG/ML FEU (ref 0–0.6)

## 2024-05-22 PROCEDURE — 36415 COLL VENOUS BLD VENIPUNCTURE: CPT

## 2024-05-22 PROCEDURE — 99283 EMERGENCY DEPT VISIT LOW MDM: CPT

## 2024-05-22 PROCEDURE — 85379 FIBRIN DEGRADATION QUANT: CPT

## 2024-05-22 PROCEDURE — 6370000000 HC RX 637 (ALT 250 FOR IP): Performed by: PHYSICIAN ASSISTANT

## 2024-05-22 RX ORDER — METOPROLOL TARTRATE 50 MG/1
50 TABLET, FILM COATED ORAL 2 TIMES DAILY
COMMUNITY

## 2024-05-22 RX ADMIN — APIXABAN 10 MG: 5 TABLET, FILM COATED ORAL at 20:45

## 2024-05-22 ASSESSMENT — PAIN DESCRIPTION - LOCATION: LOCATION: LEG

## 2024-05-22 ASSESSMENT — ENCOUNTER SYMPTOMS
ABDOMINAL PAIN: 0
EYE DISCHARGE: 0
RHINORRHEA: 0
BACK PAIN: 0
STRIDOR: 0
SHORTNESS OF BREATH: 0
SORE THROAT: 0
COUGH: 0
EYE REDNESS: 0
EYE ITCHING: 0
NAUSEA: 0
VOMITING: 0
DIARRHEA: 0

## 2024-05-22 ASSESSMENT — PAIN DESCRIPTION - ORIENTATION: ORIENTATION: LEFT

## 2024-05-22 ASSESSMENT — PAIN - FUNCTIONAL ASSESSMENT: PAIN_FUNCTIONAL_ASSESSMENT: 0-10

## 2024-05-22 ASSESSMENT — PAIN SCALES - GENERAL: PAINLEVEL_OUTOF10: 4

## 2024-05-22 NOTE — ED PROVIDER NOTES
lb)       Physical Exam  Vitals and nursing note reviewed.   Constitutional:       General: She is not in acute distress.     Appearance: Normal appearance. She is not ill-appearing, toxic-appearing or diaphoretic.   HENT:      Head: Normocephalic and atraumatic.   Eyes:      Conjunctiva/sclera: Conjunctivae normal.   Cardiovascular:      Rate and Rhythm: Normal rate and regular rhythm.      Pulses: Normal pulses.   Pulmonary:      Effort: Pulmonary effort is normal. No respiratory distress.   Abdominal:      Palpations: Abdomen is soft.   Musculoskeletal:         General: No deformity. Normal range of motion.      Cervical back: Normal range of motion and neck supple.      Right lower leg: No swelling, deformity or lacerations. No edema.      Left lower leg: Tenderness (Left proximal calf) present. No swelling, deformity or lacerations. No edema.      Comments: Spider angioma noted bilaterally.  Left venous vasculature slightly more prominent than on the right.   Skin:     General: Skin is warm and dry.      Capillary Refill: Capillary refill takes less than 2 seconds.   Neurological:      General: No focal deficit present.      Mental Status: She is alert and oriented to person, place, and time.      Sensory: No sensory deficit.      Motor: No weakness.   Psychiatric:         Mood and Affect: Mood normal.         Behavior: Behavior normal.             DIAGNOSTIC RESULTS   LABS:    Labs Reviewed   D-DIMER, QUANTITATIVE - Abnormal; Notable for the following components:       Result Value    D-Dimer, Quant 1.14 (*)     All other components within normal limits       When ordered only abnormal lab results are displayed. All other labs were within normal range or not returned as of this dictation.    EKG: When ordered, EKG's are interpreted by the Emergency Department Physician in the absence of a cardiologist.  Please see their note for interpretation of EKG.    RADIOLOGY:   Non-plain film images such as CT,

## 2024-05-23 ENCOUNTER — HOSPITAL ENCOUNTER (OUTPATIENT)
Dept: VASCULAR LAB | Age: 54
Discharge: HOME | End: 2024-05-25
Payer: COMMERCIAL

## 2024-05-23 DIAGNOSIS — M79.605 ACUTE LEG PAIN, LEFT: ICD-10-CM

## 2024-05-23 PROCEDURE — 93971 EXTREMITY STUDY: CPT

## 2024-05-23 NOTE — ED NOTES
Pt discharged in stable condition, follow up care and medication reviewed, Discharge elequis provided with follow up education, all questions answered.

## 2024-06-21 ENCOUNTER — HOSPITAL ENCOUNTER (OUTPATIENT)
Age: 54
Discharge: HOME | End: 2024-06-21
Payer: COMMERCIAL

## 2024-06-21 LAB
ALBUMIN SERPL-MCNC: 4 G/DL (ref 3.4–5)
ALBUMIN/GLOB SERPL: 1.3 {RATIO} (ref 1.1–2.2)
ALP SERPL-CCNC: 84 U/L (ref 40–129)
ALT SERPL-CCNC: 20 U/L (ref 10–40)
ANION GAP SERPL CALCULATED.3IONS-SCNC: 8 MMOL/L (ref 3–16)
APTT BLD: 25.3 SEC (ref 22.1–36.4)
AST SERPL-CCNC: 23 U/L (ref 15–37)
BILIRUB SERPL-MCNC: 0.4 MG/DL (ref 0–1)
BUN SERPL-MCNC: 18 MG/DL (ref 7–20)
CALCIUM SERPL-MCNC: 9.4 MG/DL (ref 8.3–10.6)
CHLORIDE SERPL-SCNC: 105 MMOL/L (ref 99–110)
CHOLEST SERPL-MCNC: 182 MG/DL (ref 0–199)
CO2 SERPL-SCNC: 27 MMOL/L (ref 21–32)
CREAT SERPL-MCNC: 0.5 MG/DL (ref 0.6–1.1)
DEPRECATED RDW RBC AUTO: 13.8 % (ref 12.4–15.4)
GFR SERPLBLD CREATININE-BSD FMLA CKD-EPI: >90 ML/MIN/{1.73_M2}
GLUCOSE SERPL-MCNC: 104 MG/DL (ref 70–99)
HCT VFR BLD AUTO: 42.8 % (ref 36–48)
HDLC SERPL-MCNC: 86 MG/DL (ref 40–60)
HGB BLD-MCNC: 14.3 G/DL (ref 12–16)
INR PPP: 0.86 (ref 0.85–1.15)
LDLC SERPL CALC-MCNC: 82 MG/DL
MCH RBC QN AUTO: 28 PG (ref 26–34)
MCHC RBC AUTO-ENTMCNC: 33.3 G/DL (ref 31–36)
MCV RBC AUTO: 84.1 FL (ref 80–100)
PLATELET # BLD AUTO: 201 K/UL (ref 135–450)
PMV BLD AUTO: 8.7 FL (ref 5–10.5)
POTASSIUM SERPL-SCNC: 5.1 MMOL/L (ref 3.5–5.1)
PROT SERPL-MCNC: 7 G/DL (ref 6.4–8.2)
PROTHROMBIN TIME: 12 SEC (ref 11.9–14.9)
RBC # BLD AUTO: 5.1 M/UL (ref 4–5.2)
SODIUM SERPL-SCNC: 140 MMOL/L (ref 136–145)
TRIGL SERPL-MCNC: 72 MG/DL (ref 0–150)
VLDLC SERPL CALC-MCNC: 14 MG/DL
WBC # BLD AUTO: 7.4 K/UL (ref 4–11)

## 2024-06-21 PROCEDURE — 36415 COLL VENOUS BLD VENIPUNCTURE: CPT

## 2024-06-21 PROCEDURE — 85610 PROTHROMBIN TIME: CPT

## 2024-06-21 PROCEDURE — 80053 COMPREHEN METABOLIC PANEL: CPT

## 2024-06-21 PROCEDURE — 85027 COMPLETE CBC AUTOMATED: CPT

## 2024-06-21 PROCEDURE — 80061 LIPID PANEL: CPT

## 2024-06-21 PROCEDURE — 85730 THROMBOPLASTIN TIME PARTIAL: CPT

## 2024-06-22 LAB
EKG ATRIAL RATE: 76 BPM
EKG DIAGNOSIS: NORMAL
EKG P AXIS: 55 DEGREES
EKG P-R INTERVAL: 146 MS
EKG Q-T INTERVAL: 378 MS
EKG QRS DURATION: 74 MS
EKG QTC CALCULATION (BAZETT): 425 MS
EKG R AXIS: -14 DEGREES
EKG T AXIS: 28 DEGREES
EKG VENTRICULAR RATE: 76 BPM

## 2024-06-25 ENCOUNTER — TELEPHONE (OUTPATIENT)
Dept: CARDIOLOGY CLINIC | Age: 54
End: 2024-06-25

## 2024-06-25 NOTE — TELEPHONE ENCOUNTER
New Patient Referral    Referring Provider Name:Barbara Gunter   Phone Number:[phone]  Fax Number:[phone]  Address: [address] [place] [address]    Diagnosis/Reason for Visit:abnormal EKG    Cardiac Clearance? Yes    Cardiac Testing: (Yes/No/Unsure) Unsure    Date testing was completed?___N/A________      Have records been requested? (Yes/No) No    Preferred Language:______English_________    needed? (Yes/No) No    Left message for pt to call back and schedule appt with next available provider.  jose daniel

## 2024-07-02 PROBLEM — E78.2 MIXED HYPERLIPIDEMIA: Status: RESOLVED | Noted: 2024-07-02 | Resolved: 2024-07-02

## 2024-07-02 PROBLEM — R94.31 ABNORMAL EKG: Status: ACTIVE | Noted: 2024-07-02

## 2024-07-02 PROBLEM — I10 PRIMARY HYPERTENSION: Status: ACTIVE | Noted: 2024-07-02

## 2024-07-02 PROBLEM — E78.2 MIXED HYPERLIPIDEMIA: Status: ACTIVE | Noted: 2024-07-02

## 2024-07-02 NOTE — PROGRESS NOTES
[facility]    7/3/2024    Referring Provider: Barbara Gunter DO    HISTORY:  Claribel Payne is a 53 y.o. female presenting as a new patient referred by her PCP for an abnormal EKG. She had an EKG completed on 6/22/2024 that demonstrated Normal sinus rhythm with inferior infarct and possible anterior infarct. She has a pmh of HTN, HLD, PE, and DVT. She has an upcoming left meniscus surgery.    Today she arrives with her  and is ambulatory. Denies chest pain/pressure/squeezing/tightness, dizziness/lightheadedness, shortness of breath/dyspnea on exertion. Prior to her meniscus tear, she was able to go up a flight of stairs without difficulty. Has never had any concerning CP or dyspnea. Still able to keep up with ADLs, just pain to her knee. No family history of heart disease. Hx DVT provoked at time of other LE surgery. Prior smoker.       REVIEW OF SYSTEMS:  A complete review of systems was reviewed and is negative except as noted in the history of present illness.    Prior to Visit Medications    Medication Sig Taking? Authorizing Provider   cyclobenzaprine (FLEXERIL) 10 MG tablet Take 1 tablet by mouth 3 times daily as needed for Muscle spasms Yes Fidel Timmons MD   metoprolol succinate (TOPROL XL) 50 MG extended release tablet Take 1 tablet by mouth daily Yes Fidel Timmons MD   dilTIAZem (CARDIZEM CD) 360 MG extended release capsule Take 1 capsule by mouth daily Yes Fidel Timmons MD   acetaminophen (TYLENOL 8 HOUR) 650 MG extended release tablet Take 2 tablets every 8 hours by oral route. Yes Fidel Timmons MD   hydroCHLOROthiazide (HYDRODIURIL) 25 MG tablet  Yes Fidel Timmons MD   methocarbamol (ROBAXIN) 500 MG tablet Take 1 tablet by mouth  Patient not taking: Reported on 7/3/2024  Fidel Timmons MD   atorvastatin (LIPITOR) 20 MG tablet Take 1 tablet by mouth daily  Patient not taking: Reported on 7/3/2024  Fidel Timmons MD

## 2024-07-03 ENCOUNTER — OFFICE VISIT (OUTPATIENT)
Age: 54
End: 2024-07-03
Payer: COMMERCIAL

## 2024-07-03 VITALS
HEIGHT: 62 IN | DIASTOLIC BLOOD PRESSURE: 76 MMHG | BODY MASS INDEX: 34.85 KG/M2 | OXYGEN SATURATION: 97 % | WEIGHT: 189.38 LBS | SYSTOLIC BLOOD PRESSURE: 108 MMHG | HEART RATE: 99 BPM

## 2024-07-03 DIAGNOSIS — I10 PRIMARY HYPERTENSION: ICD-10-CM

## 2024-07-03 DIAGNOSIS — R94.31 ABNORMAL EKG: Primary | ICD-10-CM

## 2024-07-03 DIAGNOSIS — I82.409 DEEP VEIN THROMBOSIS (DVT) OF LOWER EXTREMITY, UNSPECIFIED CHRONICITY, UNSPECIFIED LATERALITY, UNSPECIFIED VEIN (HCC): ICD-10-CM

## 2024-07-03 DIAGNOSIS — I26.99 PULMONARY EMBOLISM, BILATERAL (HCC): ICD-10-CM

## 2024-07-03 DIAGNOSIS — E78.00 HYPERCHOLESTEROLEMIA: ICD-10-CM

## 2024-07-03 PROCEDURE — 1036F TOBACCO NON-USER: CPT | Performed by: STUDENT IN AN ORGANIZED HEALTH CARE EDUCATION/TRAINING PROGRAM

## 2024-07-03 PROCEDURE — G8427 DOCREV CUR MEDS BY ELIG CLIN: HCPCS | Performed by: STUDENT IN AN ORGANIZED HEALTH CARE EDUCATION/TRAINING PROGRAM

## 2024-07-03 PROCEDURE — 99204 OFFICE O/P NEW MOD 45 MIN: CPT | Performed by: STUDENT IN AN ORGANIZED HEALTH CARE EDUCATION/TRAINING PROGRAM

## 2024-07-03 PROCEDURE — 3074F SYST BP LT 130 MM HG: CPT | Performed by: STUDENT IN AN ORGANIZED HEALTH CARE EDUCATION/TRAINING PROGRAM

## 2024-07-03 PROCEDURE — G8417 CALC BMI ABV UP PARAM F/U: HCPCS | Performed by: STUDENT IN AN ORGANIZED HEALTH CARE EDUCATION/TRAINING PROGRAM

## 2024-07-03 PROCEDURE — 3017F COLORECTAL CA SCREEN DOC REV: CPT | Performed by: STUDENT IN AN ORGANIZED HEALTH CARE EDUCATION/TRAINING PROGRAM

## 2024-07-03 PROCEDURE — 3078F DIAST BP <80 MM HG: CPT | Performed by: STUDENT IN AN ORGANIZED HEALTH CARE EDUCATION/TRAINING PROGRAM

## 2024-07-03 RX ORDER — METOPROLOL SUCCINATE 50 MG/1
50 TABLET, EXTENDED RELEASE ORAL DAILY
COMMUNITY

## 2024-07-03 RX ORDER — SENNOSIDES 8.6 MG
CAPSULE ORAL
COMMUNITY
Start: 2021-12-14

## 2024-07-03 RX ORDER — DILTIAZEM HYDROCHLORIDE 360 MG/1
360 CAPSULE, EXTENDED RELEASE ORAL DAILY
COMMUNITY
Start: 2024-06-25

## 2024-07-03 RX ORDER — AMLODIPINE BESYLATE 5 MG/1
5 TABLET ORAL DAILY
COMMUNITY

## 2024-07-03 RX ORDER — CYCLOBENZAPRINE HCL 10 MG
10 TABLET ORAL 3 TIMES DAILY PRN
COMMUNITY

## 2024-07-03 RX ORDER — ATORVASTATIN CALCIUM 20 MG/1
20 TABLET, FILM COATED ORAL DAILY
COMMUNITY

## 2024-07-03 RX ORDER — METHOCARBAMOL 500 MG/1
500 TABLET, FILM COATED ORAL
COMMUNITY

## 2024-07-03 NOTE — PATIENT INSTRUCTIONS
Schedule echocardiogram     After we see echocardiogram results, I will call you with the results. As long as the echo looks okay, you will be cleared for surgery

## 2024-07-10 ENCOUNTER — TELEPHONE (OUTPATIENT)
Dept: CARDIOLOGY CLINIC | Age: 54
End: 2024-07-10

## 2024-07-10 NOTE — TELEPHONE ENCOUNTER
LMOM to call back about message below.    Audra Pang, RN  [facility] Staff24 minutes ago (1:26 PM)     ML  Per C, pt needs to have her echocardiogram results prior to him signing letter for surgical clearance. Can you please call and let her know? Thanks

## 2024-07-10 NOTE — TELEPHONE ENCOUNTER
Pt called relayed message per BGC. Pt verbalized understanding. Pt stated she is scheduled on 07/26 @ 3pm for echo and the surgery was rescheduled on 8/1 surgery.

## 2024-07-10 NOTE — TELEPHONE ENCOUNTER
Nirmala called to get clarification on the Pt.  Would the Pt be cleared for Surgery on 07/11 without the Echo.  Surgery is scheduled .  Please call to discuss this matter. Thank you

## 2024-07-19 ENCOUNTER — TELEPHONE (OUTPATIENT)
Dept: CARDIOLOGY CLINIC | Age: 54
End: 2024-07-19

## 2024-07-19 NOTE — TELEPHONE ENCOUNTER
CARDIAC CLEARANCE     What type of procedure are you having?  Right knee arthroscopy     Which physician is performing your procedure?  Dr Key     When is your procedure scheduled for?  8/1/24    Where are you having this procedure?  [facility]     Are you taking Blood Thinners?  eliquis   If so what? (Name/dose/frequesncy)     Does the surgeon want you to stop your blood thinner?  If so for how long?    Phone Number and Contact Name for Physicians office:[phone]     Fax number to send information: [phone]    Please send letter and last office note and echo result

## 2024-07-26 ENCOUNTER — HOSPITAL ENCOUNTER (OUTPATIENT)
Age: 54
End: 2024-07-26
Attending: STUDENT IN AN ORGANIZED HEALTH CARE EDUCATION/TRAINING PROGRAM
Payer: COMMERCIAL

## 2024-07-26 VITALS
SYSTOLIC BLOOD PRESSURE: 108 MMHG | HEIGHT: 62 IN | DIASTOLIC BLOOD PRESSURE: 76 MMHG | BODY MASS INDEX: 34.78 KG/M2 | WEIGHT: 189 LBS

## 2024-07-26 DIAGNOSIS — I10 PRIMARY HYPERTENSION: ICD-10-CM

## 2024-07-26 DIAGNOSIS — R94.31 ABNORMAL EKG: ICD-10-CM

## 2024-07-26 LAB
ECHO AO ASC DIAM: 2.8 CM
ECHO AO ASCENDING AORTA INDEX: 1.5 CM/M2
ECHO AO ROOT DIAM: 3 CM
ECHO AO ROOT INDEX: 1.6 CM/M2
ECHO AV AREA PEAK VELOCITY: 2.7 CM2
ECHO AV AREA VTI: 2.7 CM2
ECHO AV AREA/BSA PEAK VELOCITY: 1.4 CM2/M2
ECHO AV AREA/BSA VTI: 1.4 CM2/M2
ECHO AV MEAN GRADIENT: 5 MMHG
ECHO AV MEAN VELOCITY: 1.1 M/S
ECHO AV PEAK GRADIENT: 9 MMHG
ECHO AV PEAK VELOCITY: 1.5 M/S
ECHO AV VELOCITY RATIO: 0.87
ECHO AV VTI: 29 CM
ECHO BSA: 1.94 M2
ECHO LA AREA 2C: 17.7 CM2
ECHO LA AREA 4C: 13.3 CM2
ECHO LA DIAMETER INDEX: 1.98 CM/M2
ECHO LA DIAMETER: 3.7 CM
ECHO LA MAJOR AXIS: 4.7 CM
ECHO LA MINOR AXIS: 5.3 CM
ECHO LA TO AORTIC ROOT RATIO: 1.23
ECHO LA VOL BP: 41 ML (ref 22–52)
ECHO LA VOL MOD A2C: 50 ML (ref 22–52)
ECHO LA VOL MOD A4C: 30 ML (ref 22–52)
ECHO LA VOL/BSA BIPLANE: 22 ML/M2 (ref 16–34)
ECHO LA VOLUME INDEX MOD A2C: 27 ML/M2 (ref 16–34)
ECHO LA VOLUME INDEX MOD A4C: 16 ML/M2 (ref 16–34)
ECHO LV E' LATERAL VELOCITY: 10 CM/S
ECHO LV E' SEPTAL VELOCITY: 8 CM/S
ECHO LV EDV A2C: 45 ML
ECHO LV EDV A4C: 56 ML
ECHO LV EDV INDEX A4C: 30 ML/M2
ECHO LV EDV NDEX A2C: 24 ML/M2
ECHO LV EJECTION FRACTION A2C: 74 %
ECHO LV EJECTION FRACTION A4C: 74 %
ECHO LV EJECTION FRACTION BIPLANE: 74 % (ref 55–100)
ECHO LV ESV A2C: 12 ML
ECHO LV ESV A4C: 14 ML
ECHO LV ESV INDEX A2C: 6 ML/M2
ECHO LV ESV INDEX A4C: 7 ML/M2
ECHO LV FRACTIONAL SHORTENING: 38 % (ref 28–44)
ECHO LV INTERNAL DIMENSION DIASTOLE INDEX: 2.57 CM/M2
ECHO LV INTERNAL DIMENSION DIASTOLIC: 4.8 CM (ref 3.9–5.3)
ECHO LV INTERNAL DIMENSION SYSTOLIC INDEX: 1.6 CM/M2
ECHO LV INTERNAL DIMENSION SYSTOLIC: 3 CM
ECHO LV IVSD: 0.8 CM (ref 0.6–0.9)
ECHO LV MASS 2D: 126.7 G (ref 67–162)
ECHO LV MASS INDEX 2D: 67.7 G/M2 (ref 43–95)
ECHO LV POSTERIOR WALL DIASTOLIC: 0.8 CM (ref 0.6–0.9)
ECHO LV RELATIVE WALL THICKNESS RATIO: 0.33
ECHO LVOT AREA: 3.1 CM2
ECHO LVOT AV VTI INDEX: 0.84
ECHO LVOT DIAM: 2 CM
ECHO LVOT MEAN GRADIENT: 4 MMHG
ECHO LVOT PEAK GRADIENT: 7 MMHG
ECHO LVOT PEAK VELOCITY: 1.3 M/S
ECHO LVOT STROKE VOLUME INDEX: 41.1 ML/M2
ECHO LVOT SV: 76.9 ML
ECHO LVOT VTI: 24.5 CM
ECHO MV A VELOCITY: 0.87 M/S
ECHO MV E VELOCITY: 0.81 M/S
ECHO MV E/A RATIO: 0.93
ECHO MV E/E' LATERAL: 8.1
ECHO MV E/E' RATIO (AVERAGED): 9.11
ECHO MV E/E' SEPTAL: 10.13
ECHO PV MAX VELOCITY: 1.1 M/S
ECHO PV PEAK GRADIENT: 5 MMHG
ECHO RA AREA 4C: 8.4 CM2
ECHO RA END SYSTOLIC VOLUME APICAL 4 CHAMBER INDEX BSA: 7 ML/M2
ECHO RA VOLUME: 14 ML
ECHO RV BASAL DIMENSION: 2.7 CM
ECHO RV FREE WALL PEAK S': 13 CM/S
ECHO RV LONGITUDINAL DIMENSION: 7.4 CM
ECHO RV MID DIMENSION: 1.7 CM
ECHO RV TAPSE: 2.2 CM (ref 1.7–?)

## 2024-07-26 PROCEDURE — 93306 TTE W/DOPPLER COMPLETE: CPT | Performed by: INTERNAL MEDICINE

## 2024-07-26 PROCEDURE — 93306 TTE W/DOPPLER COMPLETE: CPT

## 2024-07-29 NOTE — TELEPHONE ENCOUNTER
Zoila [phone]  with [facility] states this should be for the left knee.    Can we revise the clearance letter for the left knee and fax to [phone]    She is also requesting echo results.  Please fax to [phone].